# Patient Record
Sex: FEMALE | Race: WHITE | Employment: OTHER | URBAN - METROPOLITAN AREA
[De-identification: names, ages, dates, MRNs, and addresses within clinical notes are randomized per-mention and may not be internally consistent; named-entity substitution may affect disease eponyms.]

---

## 2019-10-29 ENCOUNTER — APPOINTMENT (OUTPATIENT)
Dept: GENERAL RADIOLOGY | Age: 74
End: 2019-10-29
Payer: MEDICARE

## 2019-10-29 ENCOUNTER — HOSPITAL ENCOUNTER (EMERGENCY)
Age: 74
Discharge: HOME | End: 2019-10-29
Attending: EMERGENCY MEDICINE
Payer: MEDICARE

## 2019-10-29 VITALS
DIASTOLIC BLOOD PRESSURE: 72 MMHG | HEART RATE: 76 BPM | SYSTOLIC BLOOD PRESSURE: 140 MMHG | RESPIRATION RATE: 16 BRPM | OXYGEN SATURATION: 99 % | TEMPERATURE: 98.1 F

## 2019-10-29 DIAGNOSIS — R00.2 PALPITATIONS: Primary | ICD-10-CM

## 2019-10-29 LAB
A/G RATIO: 1.8 (ref 1.1–2.2)
ALBUMIN SERPL-MCNC: 4.6 G/DL (ref 3.4–5)
ALP BLD-CCNC: 57 U/L (ref 40–129)
ALT SERPL-CCNC: 35 U/L (ref 10–40)
ANION GAP SERPL CALCULATED.3IONS-SCNC: 15 MMOL/L (ref 3–16)
AST SERPL-CCNC: 31 U/L (ref 15–37)
BASOPHILS ABSOLUTE: 0 K/UL (ref 0–0.2)
BASOPHILS RELATIVE PERCENT: 0.4 %
BILIRUB SERPL-MCNC: 0.5 MG/DL (ref 0–1)
BILIRUBIN URINE: NEGATIVE
BLOOD, URINE: NEGATIVE
BUN BLDV-MCNC: 11 MG/DL (ref 7–20)
CALCIUM SERPL-MCNC: 9.9 MG/DL (ref 8.3–10.6)
CHLORIDE BLD-SCNC: 94 MMOL/L (ref 99–110)
CLARITY: CLEAR
CO2: 23 MMOL/L (ref 21–32)
COLOR: YELLOW
CREAT SERPL-MCNC: 0.6 MG/DL (ref 0.6–1.2)
EOSINOPHILS ABSOLUTE: 0 K/UL (ref 0–0.6)
EOSINOPHILS RELATIVE PERCENT: 0.1 %
GFR AFRICAN AMERICAN: >60
GFR NON-AFRICAN AMERICAN: >60
GLOBULIN: 2.6 G/DL
GLUCOSE BLD-MCNC: 118 MG/DL (ref 70–99)
GLUCOSE URINE: NEGATIVE MG/DL
HCT VFR BLD CALC: 46.4 % (ref 36–48)
HEMOGLOBIN: 16 G/DL (ref 12–16)
KETONES, URINE: NEGATIVE MG/DL
LEUKOCYTE ESTERASE, URINE: ABNORMAL
LYMPHOCYTES ABSOLUTE: 0.7 K/UL (ref 1–5.1)
LYMPHOCYTES RELATIVE PERCENT: 9.5 %
MAGNESIUM: 2.2 MG/DL (ref 1.8–2.4)
MCH RBC QN AUTO: 33.7 PG (ref 26–34)
MCHC RBC AUTO-ENTMCNC: 34.6 G/DL (ref 31–36)
MCV RBC AUTO: 97.5 FL (ref 80–100)
MICROSCOPIC EXAMINATION: YES
MONOCYTES ABSOLUTE: 0.6 K/UL (ref 0–1.3)
MONOCYTES RELATIVE PERCENT: 7.8 %
NEUTROPHILS ABSOLUTE: 6.4 K/UL (ref 1.7–7.7)
NEUTROPHILS RELATIVE PERCENT: 82.2 %
NITRITE, URINE: NEGATIVE
PDW BLD-RTO: 12.4 % (ref 12.4–15.4)
PH UA: 6 (ref 5–8)
PHOSPHORUS: 3.6 MG/DL (ref 2.5–4.9)
PLATELET # BLD: 228 K/UL (ref 135–450)
PMV BLD AUTO: 6.9 FL (ref 5–10.5)
POTASSIUM SERPL-SCNC: 4.3 MMOL/L (ref 3.5–5.1)
PROTEIN UA: NEGATIVE MG/DL
RBC # BLD: 4.76 M/UL (ref 4–5.2)
RBC UA: NORMAL /HPF (ref 0–2)
SODIUM BLD-SCNC: 132 MMOL/L (ref 136–145)
SPECIFIC GRAVITY UA: <=1.005 (ref 1–1.03)
TOTAL PROTEIN: 7.2 G/DL (ref 6.4–8.2)
TROPONIN: <0.01 NG/ML
URINE TYPE: ABNORMAL
UROBILINOGEN, URINE: 0.2 E.U./DL
WBC # BLD: 7.8 K/UL (ref 4–11)
WBC UA: NORMAL /HPF (ref 0–5)

## 2019-10-29 PROCEDURE — 84100 ASSAY OF PHOSPHORUS: CPT

## 2019-10-29 PROCEDURE — 83735 ASSAY OF MAGNESIUM: CPT

## 2019-10-29 PROCEDURE — 93005 ELECTROCARDIOGRAM TRACING: CPT | Performed by: EMERGENCY MEDICINE

## 2019-10-29 PROCEDURE — 85025 COMPLETE CBC W/AUTO DIFF WBC: CPT

## 2019-10-29 PROCEDURE — 84484 ASSAY OF TROPONIN QUANT: CPT

## 2019-10-29 PROCEDURE — 80053 COMPREHEN METABOLIC PANEL: CPT

## 2019-10-29 PROCEDURE — 81001 URINALYSIS AUTO W/SCOPE: CPT

## 2019-10-29 PROCEDURE — 71046 X-RAY EXAM CHEST 2 VIEWS: CPT

## 2019-10-29 PROCEDURE — 99285 EMERGENCY DEPT VISIT HI MDM: CPT

## 2019-10-29 RX ORDER — PREDNISONE 1 MG/1
5 TABLET ORAL DAILY
Status: ON HOLD | COMMUNITY
End: 2021-05-12

## 2019-10-29 RX ORDER — AMLODIPINE BESYLATE 5 MG/1
5 TABLET ORAL DAILY
Status: ON HOLD | COMMUNITY
End: 2021-05-12

## 2019-10-29 RX ORDER — CHLORAL HYDRATE 500 MG
CAPSULE ORAL
COMMUNITY

## 2019-10-29 RX ORDER — ASCORBIC ACID 1000 MG
TABLET ORAL
COMMUNITY

## 2019-10-29 RX ORDER — MELOXICAM 15 MG/1
15 TABLET ORAL DAILY
Status: ON HOLD | COMMUNITY
End: 2021-05-12

## 2019-10-29 RX ORDER — AMPICILLIN TRIHYDRATE 250 MG
CAPSULE ORAL
Status: ON HOLD | COMMUNITY
End: 2021-05-13

## 2019-10-29 RX ORDER — METHOCARBAMOL 500 MG/1
500 TABLET, FILM COATED ORAL 4 TIMES DAILY
Status: ON HOLD | COMMUNITY
End: 2021-05-12

## 2019-10-29 RX ORDER — DONEPEZIL HYDROCHLORIDE 5 MG/1
10 TABLET, FILM COATED ORAL NIGHTLY
COMMUNITY

## 2019-10-29 RX ORDER — ASPIRIN 81 MG/1
81 TABLET, CHEWABLE ORAL DAILY
Status: ON HOLD | COMMUNITY
End: 2021-05-13

## 2019-10-29 RX ORDER — THIAMINE MONONITRATE (VIT B1) 100 MG
100 TABLET ORAL DAILY
COMMUNITY

## 2019-10-29 RX ORDER — CALCIUM CARBONATE 500(1250)
500 TABLET ORAL DAILY
COMMUNITY

## 2019-10-29 ASSESSMENT — ENCOUNTER SYMPTOMS
COLOR CHANGE: 0
ABDOMINAL PAIN: 0
BACK PAIN: 0
SHORTNESS OF BREATH: 0
EYES NEGATIVE: 1

## 2019-10-31 LAB
EKG ATRIAL RATE: 234 BPM
EKG ATRIAL RATE: 76 BPM
EKG DIAGNOSIS: NORMAL
EKG DIAGNOSIS: NORMAL
EKG P AXIS: 71 DEGREES
EKG P-R INTERVAL: 180 MS
EKG Q-T INTERVAL: 316 MS
EKG Q-T INTERVAL: 370 MS
EKG QRS DURATION: 68 MS
EKG QRS DURATION: 72 MS
EKG QTC CALCULATION (BAZETT): 384 MS
EKG QTC CALCULATION (BAZETT): 416 MS
EKG R AXIS: 65 DEGREES
EKG R AXIS: 99 DEGREES
EKG T AXIS: 56 DEGREES
EKG T AXIS: 60 DEGREES
EKG VENTRICULAR RATE: 76 BPM
EKG VENTRICULAR RATE: 89 BPM

## 2019-10-31 PROCEDURE — 93010 ELECTROCARDIOGRAM REPORT: CPT | Performed by: INTERNAL MEDICINE

## 2021-05-11 ENCOUNTER — HOSPITAL ENCOUNTER (INPATIENT)
Age: 76
LOS: 3 days | Discharge: NURSING, REHAB OR LONG-TERM CARE | DRG: 641 | End: 2021-05-17
Attending: EMERGENCY MEDICINE | Admitting: INTERNAL MEDICINE
Payer: MEDICARE

## 2021-05-11 ENCOUNTER — APPOINTMENT (OUTPATIENT)
Dept: CT IMAGING | Age: 76
DRG: 641 | End: 2021-05-11
Payer: MEDICARE

## 2021-05-11 ENCOUNTER — APPOINTMENT (OUTPATIENT)
Dept: GENERAL RADIOLOGY | Age: 76
DRG: 641 | End: 2021-05-11
Payer: MEDICARE

## 2021-05-11 DIAGNOSIS — E87.1 HYPONATREMIA: ICD-10-CM

## 2021-05-11 DIAGNOSIS — R29.6 FREQUENT FALLS: Primary | ICD-10-CM

## 2021-05-11 DIAGNOSIS — S22.080A COMPRESSION FRACTURE OF T12 VERTEBRA, INITIAL ENCOUNTER (HCC): ICD-10-CM

## 2021-05-11 PROBLEM — M50.30 DDD (DEGENERATIVE DISC DISEASE), CERVICAL: Status: ACTIVE | Noted: 2021-05-11

## 2021-05-11 PROBLEM — R53.1 GENERALIZED WEAKNESS: Status: ACTIVE | Noted: 2021-05-11

## 2021-05-11 PROBLEM — M51.36 DDD (DEGENERATIVE DISC DISEASE), LUMBAR: Status: ACTIVE | Noted: 2021-05-11

## 2021-05-11 PROBLEM — F03.90 DEMENTIA (HCC): Status: ACTIVE | Noted: 2021-05-11

## 2021-05-11 LAB
A/G RATIO: 1.7 (ref 1.1–2.2)
ALBUMIN SERPL-MCNC: 4 G/DL (ref 3.4–5)
ALP BLD-CCNC: 70 U/L (ref 40–129)
ALT SERPL-CCNC: 11 U/L (ref 10–40)
ANION GAP SERPL CALCULATED.3IONS-SCNC: 7 MMOL/L (ref 3–16)
AST SERPL-CCNC: 19 U/L (ref 15–37)
BASOPHILS ABSOLUTE: 0 K/UL (ref 0–0.2)
BASOPHILS RELATIVE PERCENT: 0.7 %
BILIRUB SERPL-MCNC: 0.3 MG/DL (ref 0–1)
BILIRUBIN URINE: NEGATIVE
BLOOD, URINE: NEGATIVE
BUN BLDV-MCNC: 4 MG/DL (ref 7–20)
CALCIUM SERPL-MCNC: 9.2 MG/DL (ref 8.3–10.6)
CHLORIDE BLD-SCNC: 90 MMOL/L (ref 99–110)
CLARITY: CLEAR
CO2: 26 MMOL/L (ref 21–32)
COLOR: YELLOW
CREAT SERPL-MCNC: <0.5 MG/DL (ref 0.6–1.2)
EKG ATRIAL RATE: 65 BPM
EKG DIAGNOSIS: NORMAL
EKG P AXIS: 78 DEGREES
EKG P-R INTERVAL: 230 MS
EKG Q-T INTERVAL: 388 MS
EKG QRS DURATION: 64 MS
EKG QTC CALCULATION (BAZETT): 403 MS
EKG R AXIS: 81 DEGREES
EKG T AXIS: 66 DEGREES
EKG VENTRICULAR RATE: 65 BPM
EOSINOPHILS ABSOLUTE: 0 K/UL (ref 0–0.6)
EOSINOPHILS RELATIVE PERCENT: 0.3 %
GFR AFRICAN AMERICAN: >60
GFR NON-AFRICAN AMERICAN: >60
GLOBULIN: 2.3 G/DL
GLUCOSE BLD-MCNC: 99 MG/DL (ref 70–99)
GLUCOSE URINE: NEGATIVE MG/DL
HCT VFR BLD CALC: 35.6 % (ref 36–48)
HEMOGLOBIN: 12.2 G/DL (ref 12–16)
KETONES, URINE: NEGATIVE MG/DL
LEUKOCYTE ESTERASE, URINE: NEGATIVE
LYMPHOCYTES ABSOLUTE: 1.2 K/UL (ref 1–5.1)
LYMPHOCYTES RELATIVE PERCENT: 16 %
MCH RBC QN AUTO: 30.7 PG (ref 26–34)
MCHC RBC AUTO-ENTMCNC: 34.3 G/DL (ref 31–36)
MCV RBC AUTO: 89.3 FL (ref 80–100)
MICROSCOPIC EXAMINATION: NORMAL
MONOCYTES ABSOLUTE: 0.5 K/UL (ref 0–1.3)
MONOCYTES RELATIVE PERCENT: 7.2 %
NEUTROPHILS ABSOLUTE: 5.5 K/UL (ref 1.7–7.7)
NEUTROPHILS RELATIVE PERCENT: 75.8 %
NITRITE, URINE: NEGATIVE
PDW BLD-RTO: 12.5 % (ref 12.4–15.4)
PH UA: 7.5 (ref 5–8)
PLATELET # BLD: 255 K/UL (ref 135–450)
PMV BLD AUTO: 7.2 FL (ref 5–10.5)
POTASSIUM SERPL-SCNC: 4.8 MMOL/L (ref 3.5–5.1)
PROTEIN UA: NEGATIVE MG/DL
RBC # BLD: 3.99 M/UL (ref 4–5.2)
SODIUM BLD-SCNC: 123 MMOL/L (ref 136–145)
SPECIFIC GRAVITY UA: 1.01 (ref 1–1.03)
TOTAL PROTEIN: 6.3 G/DL (ref 6.4–8.2)
TROPONIN: <0.01 NG/ML
URINE TYPE: NORMAL
UROBILINOGEN, URINE: 0.2 E.U./DL
WBC # BLD: 7.2 K/UL (ref 4–11)

## 2021-05-11 PROCEDURE — 72131 CT LUMBAR SPINE W/O DYE: CPT

## 2021-05-11 PROCEDURE — 99284 EMERGENCY DEPT VISIT MOD MDM: CPT

## 2021-05-11 PROCEDURE — 93005 ELECTROCARDIOGRAM TRACING: CPT | Performed by: PHYSICIAN ASSISTANT

## 2021-05-11 PROCEDURE — 6360000002 HC RX W HCPCS: Performed by: PHYSICIAN ASSISTANT

## 2021-05-11 PROCEDURE — 70450 CT HEAD/BRAIN W/O DYE: CPT

## 2021-05-11 PROCEDURE — U0003 INFECTIOUS AGENT DETECTION BY NUCLEIC ACID (DNA OR RNA); SEVERE ACUTE RESPIRATORY SYNDROME CORONAVIRUS 2 (SARS-COV-2) (CORONAVIRUS DISEASE [COVID-19]), AMPLIFIED PROBE TECHNIQUE, MAKING USE OF HIGH THROUGHPUT TECHNOLOGIES AS DESCRIBED BY CMS-2020-01-R: HCPCS

## 2021-05-11 PROCEDURE — 6370000000 HC RX 637 (ALT 250 FOR IP): Performed by: INTERNAL MEDICINE

## 2021-05-11 PROCEDURE — 72125 CT NECK SPINE W/O DYE: CPT

## 2021-05-11 PROCEDURE — 93010 ELECTROCARDIOGRAM REPORT: CPT | Performed by: INTERNAL MEDICINE

## 2021-05-11 PROCEDURE — 71045 X-RAY EXAM CHEST 1 VIEW: CPT

## 2021-05-11 PROCEDURE — 84484 ASSAY OF TROPONIN QUANT: CPT

## 2021-05-11 PROCEDURE — 96374 THER/PROPH/DIAG INJ IV PUSH: CPT

## 2021-05-11 PROCEDURE — 2580000003 HC RX 258: Performed by: PHYSICIAN ASSISTANT

## 2021-05-11 PROCEDURE — 80053 COMPREHEN METABOLIC PANEL: CPT

## 2021-05-11 PROCEDURE — U0005 INFEC AGEN DETEC AMPLI PROBE: HCPCS

## 2021-05-11 PROCEDURE — 81003 URINALYSIS AUTO W/O SCOPE: CPT

## 2021-05-11 PROCEDURE — 96361 HYDRATE IV INFUSION ADD-ON: CPT

## 2021-05-11 PROCEDURE — 85025 COMPLETE CBC W/AUTO DIFF WBC: CPT

## 2021-05-11 PROCEDURE — 6360000002 HC RX W HCPCS: Performed by: INTERNAL MEDICINE

## 2021-05-11 PROCEDURE — 96372 THER/PROPH/DIAG INJ SC/IM: CPT

## 2021-05-11 PROCEDURE — 73610 X-RAY EXAM OF ANKLE: CPT

## 2021-05-11 RX ORDER — ASPIRIN 81 MG/1
81 TABLET, CHEWABLE ORAL DAILY
Status: DISCONTINUED | OUTPATIENT
Start: 2021-05-12 | End: 2021-05-17 | Stop reason: HOSPADM

## 2021-05-11 RX ORDER — METHOCARBAMOL 500 MG/1
500 TABLET, FILM COATED ORAL 4 TIMES DAILY
Status: DISCONTINUED | OUTPATIENT
Start: 2021-05-11 | End: 2021-05-11

## 2021-05-11 RX ORDER — DONEPEZIL HYDROCHLORIDE 5 MG/1
5 TABLET, FILM COATED ORAL NIGHTLY
Status: DISCONTINUED | OUTPATIENT
Start: 2021-05-11 | End: 2021-05-11

## 2021-05-11 RX ORDER — AMLODIPINE BESYLATE 5 MG/1
5 TABLET ORAL DAILY
Status: DISCONTINUED | OUTPATIENT
Start: 2021-05-12 | End: 2021-05-17 | Stop reason: HOSPADM

## 2021-05-11 RX ORDER — IBUPROFEN 400 MG/1
400 TABLET ORAL EVERY 6 HOURS PRN
Status: DISCONTINUED | OUTPATIENT
Start: 2021-05-11 | End: 2021-05-14

## 2021-05-11 RX ORDER — LORAZEPAM 2 MG/ML
1 INJECTION INTRAMUSCULAR ONCE
Status: COMPLETED | OUTPATIENT
Start: 2021-05-11 | End: 2021-05-11

## 2021-05-11 RX ORDER — PROMETHAZINE HYDROCHLORIDE 25 MG/1
12.5 TABLET ORAL EVERY 6 HOURS PRN
Status: DISCONTINUED | OUTPATIENT
Start: 2021-05-11 | End: 2021-05-17 | Stop reason: HOSPADM

## 2021-05-11 RX ORDER — PREDNISONE 1 MG/1
5 TABLET ORAL DAILY
Status: DISCONTINUED | OUTPATIENT
Start: 2021-05-12 | End: 2021-05-11

## 2021-05-11 RX ORDER — ACETAMINOPHEN 650 MG/1
650 SUPPOSITORY RECTAL EVERY 6 HOURS PRN
Status: DISCONTINUED | OUTPATIENT
Start: 2021-05-11 | End: 2021-05-17 | Stop reason: HOSPADM

## 2021-05-11 RX ORDER — CALCIUM CARBONATE 500(1250)
500 TABLET ORAL DAILY
Status: DISCONTINUED | OUTPATIENT
Start: 2021-05-12 | End: 2021-05-17 | Stop reason: HOSPADM

## 2021-05-11 RX ORDER — SODIUM CHLORIDE 0.9 % (FLUSH) 0.9 %
10 SYRINGE (ML) INJECTION PRN
Status: DISCONTINUED | OUTPATIENT
Start: 2021-05-11 | End: 2021-05-17 | Stop reason: HOSPADM

## 2021-05-11 RX ORDER — ONDANSETRON 2 MG/ML
4 INJECTION INTRAMUSCULAR; INTRAVENOUS EVERY 6 HOURS PRN
Status: DISCONTINUED | OUTPATIENT
Start: 2021-05-11 | End: 2021-05-17 | Stop reason: HOSPADM

## 2021-05-11 RX ORDER — 0.9 % SODIUM CHLORIDE 0.9 %
1000 INTRAVENOUS SOLUTION INTRAVENOUS ONCE
Status: COMPLETED | OUTPATIENT
Start: 2021-05-11 | End: 2021-05-11

## 2021-05-11 RX ORDER — LANOLIN ALCOHOL/MO/W.PET/CERES
3 CREAM (GRAM) TOPICAL NIGHTLY PRN
Status: DISCONTINUED | OUTPATIENT
Start: 2021-05-11 | End: 2021-05-17 | Stop reason: HOSPADM

## 2021-05-11 RX ORDER — ACETAMINOPHEN 325 MG/1
650 TABLET ORAL EVERY 6 HOURS PRN
Status: DISCONTINUED | OUTPATIENT
Start: 2021-05-11 | End: 2021-05-17 | Stop reason: HOSPADM

## 2021-05-11 RX ORDER — DONEPEZIL HYDROCHLORIDE 5 MG/1
10 TABLET, FILM COATED ORAL NIGHTLY
Status: DISCONTINUED | OUTPATIENT
Start: 2021-05-11 | End: 2021-05-17 | Stop reason: HOSPADM

## 2021-05-11 RX ORDER — SODIUM CHLORIDE 9 MG/ML
25 INJECTION, SOLUTION INTRAVENOUS PRN
Status: DISCONTINUED | OUTPATIENT
Start: 2021-05-11 | End: 2021-05-17 | Stop reason: HOSPADM

## 2021-05-11 RX ORDER — MELOXICAM 7.5 MG/1
15 TABLET ORAL DAILY
Status: DISCONTINUED | OUTPATIENT
Start: 2021-05-12 | End: 2021-05-11

## 2021-05-11 RX ADMIN — SODIUM CHLORIDE 1000 ML: 9 INJECTION, SOLUTION INTRAVENOUS at 16:59

## 2021-05-11 RX ADMIN — Medication 3 MG: at 21:43

## 2021-05-11 RX ADMIN — ENOXAPARIN SODIUM 40 MG: 40 INJECTION SUBCUTANEOUS at 21:43

## 2021-05-11 RX ADMIN — LORAZEPAM 1 MG: 2 INJECTION, SOLUTION INTRAMUSCULAR; INTRAVENOUS at 15:13

## 2021-05-11 RX ADMIN — IBUPROFEN 400 MG: 400 TABLET, FILM COATED ORAL at 21:43

## 2021-05-11 RX ADMIN — DONEPEZIL HYDROCHLORIDE 10 MG: 5 TABLET, FILM COATED ORAL at 21:43

## 2021-05-11 ASSESSMENT — PAIN DESCRIPTION - LOCATION: LOCATION: BACK

## 2021-05-11 ASSESSMENT — PAIN DESCRIPTION - PAIN TYPE: TYPE: ACUTE PAIN

## 2021-05-11 NOTE — ED NOTES
Spoke to patient's daughter/POA, Milan Wilson. Daughter is currently out of town in [place]. She states patient has dementia and chronic back pain. Pt currently lives in a one floor house with a roommate. Pt had nerve damage which causes issues on the right side. Daughter states progressive weakness has rapidly progressed for the past week. Fell twice PTA in ED. Pt was taken to physical therapy appointment today and didn't do well. Pt sees Dr. Linsey Gottlieb at [facility]. Pt's daughter states she would like her daughter to be admitted to [facility] for rehab assistance if ED eval checks out okay. Pt's daughter does not have a specific facility in mind, but doesn't have one in particular in mind.       JULY Gunn  05/11/21 [id number]

## 2021-05-11 NOTE — CARE COORDINATION
Consult received: Assistance with discharge planning Daughter/POA would like rehab placement if possible    Writer placed call to Dtr whom is in agreement with SNF placement. Patient will need Pt/OT recs to support need. Writer sent list of SNF options to family to review. Case management to follow up on 1st and 2nd choice and make referrals as obtained. Writer also made referral to [facility] for LTC support option ie Assisted living placement. [place]

## 2021-05-11 NOTE — ED PROVIDER NOTES
[facility] Emergency Department    CHIEF COMPLAINT  Fall (patient reporting an increase in falls. states she fell yesterday getting out of the car, and again this morning going to the restroom. pt reporting she had to have assistance getting back to her chair. pt states she takes PT on Tuesdays and Thursdays but reports, \"My fucking legs don;'t work\" patient brought into the ED in a wheelchair and assisted to the stretcher from the wheelchair, pt gave very little assistance with transfer. pt c/o low back pain and left leg pain ), Gait Problem, Back Pain, and Leg Pain      SHARED SERVICE VISIT  I have seen and evaluated this patient with my supervising physician, Dr. Jacob Delgado. HISTORY OF PRESENT ILLNESS  Nancy Reyna is a 70 y.o. female who presents to the ED complaining of leg and back pain with frequent falls. Patient melo brittian. According to chart review has history of dementia. She lives at home with a friend. States that she typically ambulates using walker. Lost balance last week falling backwards striking head. Denies loss of consciousness. She has had no headaches dizziness. She denies neck pain. Does have increased low back pain. She denies any numbness, tingling, weakness of extremities. She has no complaints of chest pain or shortness of breath. Denies abdominal pain. No nausea, vomiting or diarrhea. No loss of bowel or bladder function. No saddle anesthesia. Is complaining of left ankle pain. States that when attempting to get up today she states that she slid out of her chair and required help up from the floor. Went to physical therapy today and reports that she worked on upper extremities. Patient melo historbraulio. No other complaints, modifying factors or associated symptoms. Nursing notes reviewed.    Past Medical History:   Diagnosis Date    Falls     Hypertension      Past Surgical History:   Procedure Laterality Date    HYSTERECTOMY      KNEE SURGERY      SHOULDER SURGERY       History reviewed. No pertinent family history. Social History     Socioeconomic History    Marital status: Single     Spouse name: Not on file    Number of children: Not on file    Years of education: Not on file    Highest education level: Not on file   Occupational History    Not on file   Social Needs    Financial resource strain: Not on file    Food insecurity     Worry: Not on file     Inability: Not on file    Transportation needs     Medical: Not on file     Non-medical: Not on file   Tobacco Use    Smoking status: Former Smoker    Smokeless tobacco: Never Used   Substance and Sexual Activity    Alcohol use: Yes    Drug use: Not on file    Sexual activity: Not Currently   Lifestyle    Physical activity     Days per week: Not on file     Minutes per session: Not on file    Stress: Not on file   Relationships    Social connections     Talks on phone: Not on file     Gets together: Not on file     Attends Yazidism service: Not on file     Active member of club or organization: Not on file     Attends meetings of clubs or organizations: Not on file     Relationship status: Not on file    Intimate partner violence     Fear of current or ex partner: Not on file     Emotionally abused: Not on file     Physically abused: Not on file     Forced sexual activity: Not on file   Other Topics Concern    Not on file   Social History Narrative    Not on file     No current facility-administered medications for this encounter.       Current Outpatient Medications   Medication Sig Dispense Refill    donepezil (ARICEPT) 5 MG tablet Take 5 mg by mouth nightly      amLODIPine (NORVASC) 5 MG tablet Take 5 mg by mouth daily      meloxicam (MOBIC) 15 MG tablet Take 15 mg by mouth daily      Cinnamon 500 MG CAPS Take by mouth      calcium carbonate (OSCAL) 500 MG TABS tablet Take 500 mg by mouth daily      Omega-3 1000 MG CAPS Take by mouth      methocarbamol (ROBAXIN) 500 MG tablet Take 500 mg by mouth 4 times daily      vitamin B-1 (THIAMINE) 100 MG tablet Take 100 mg by mouth daily      aspirin 81 MG chewable tablet Take 81 mg by mouth daily      RESVERATROL PO Take by mouth 2 times daily      Garlic 10 MG CAPS Take by mouth      Coenzyme Q10 (CO Q 10) 10 MG CAPS Take by mouth      predniSONE (DELTASONE) 5 MG tablet Take 5 mg by mouth daily       No Known Allergies    REVIEW OF SYSTEMS  10 systems reviewed, pertinent positives per HPI otherwise noted to be negative    PHYSICAL EXAM  BP (!) 113/50   Pulse 73   Temp 97.8 °F (36.6 °C) (Oral)   Resp 19   Ht 4' 6\" (1.372 m)   Wt 117 lb (53.1 kg)   SpO2 99%   BMI 28.21 kg/m²   GENERAL APPEARANCE: Awake and alert. Cooperative. No acute distress. HEAD: Normocephalic. Atraumatic. Posterior scalp hematoma. No additional hematomas, lesions, lacerations or abrasions. Negative for singh signs or raccoon's eyes. EYES: PERRL. EOM's grossly intact. No periorbital edema or erythema. No proptosis. No blood noted anterior chambers. ENT: Mucous membranes are moist.   NECK: Supple. No midline bony tenderness. No crepitus, deformity, or step-off noted. No swelling, bruising, color changes. HEART: RRR. No murmurs. No chest wall tenderness. LUNGS: Respirations unlabored. CTAB. Good air exchange. Speaking comfortably in full sentences. ABDOMEN: Soft. Non-distended. Non-tender. No guarding or rebound. No midline pulsatile mass. BACK: On exam of thoracic and lumbar spine, there is no swelling, bruising, or color change noted. There is mild to moderate lumbar midline bony tenderness, without crepitus, deformity, or step off. Patient exhibits tenderness of lumbar paraspinal musculature to right and left of midline. There is no point tenderness over the SI Joint. EXTREMITIES: No peripheral edema. No pain in upper extremities. No pelvic instability. No reproducible tenderness to knees.   Left ankle tender upon palpation without redness warmth or swelling. No evidence of dislocation subluxations. Normal range of motion and strength testing. Pedal pulses palpable. Moves all extremities equally. All extremities neurovascularly intact. SKIN: Warm and dry. No acute rashes. NEUROLOGICAL: Alert and oriented. CN's 2-12 intact. No gross facial drooping. Strength 5/5, sensation intact. HSNE spine intact. PSYCHIATRIC: Normal mood and affect. RADIOLOGY  Xr Ankle Left (min 3 Views)    Result Date: 5/11/2021  EXAMINATION: THREE XRAY VIEWS OF THE LEFT ANKLE 5/11/2021 1:49 pm COMPARISON: None. HISTORY: ORDERING SYSTEM PROVIDED HISTORY: fall TECHNOLOGIST PROVIDED HISTORY: Reason for exam:->fall FINDINGS: Visualized portions of the tibia and fibula appear intact, articulating normally at the ankle joint. Dome of the talus and tibial plafond appear unremarkable. Ankle mortise appears normally aligned. No retained radiopaque foreign body. Surrounding soft tissues appear mildly edematous anterior. No acute osseous abnormality evident. Soft tissue edema, presumably reactive edema, contusion and/or sprain. Follow up imaging is recommended if pain persists or worsens. Ct Head Wo Contrast    Result Date: 5/11/2021  EXAMINATION: CT OF THE HEAD WITHOUT CONTRAST  5/11/2021 2:09 pm TECHNIQUE: CT of the head was performed without the administration of intravenous contrast. Dose modulation, iterative reconstruction, and/or weight based adjustment of the mA/kV was utilized to reduce the radiation dose to as low as reasonably achievable. COMPARISON: None. HISTORY: ORDERING SYSTEM PROVIDED HISTORY: fall TECHNOLOGIST PROVIDED HISTORY: Reason for exam:->fall Has a \"code stroke\" or \"stroke alert\" been called? ->No Decision Support Exception - unselect if not a suspected or confirmed emergency medical condition->Emergency Medical Condition (MA) Reason for Exam: fall this AM, hit head, bump on back of head, no LOC Acuity: Acute Type of Exam: Initial FINDINGS: Motion artifact degrades the study BRAIN/VENTRICLES: Ventricles are midline in position. There is prominence of the sulci, compatible with atrophy. There is mild-to-moderate periventricular hypodensity seen. Scattered additional areas of hypodensity are seen throughout the frontal and parietal white matter. There is intracranial atherosclerosis. ORBITS: The visualized portion of the orbits demonstrate no acute abnormality. SINUSES: No significant mastoid opacification noted. There is near complete opacification of the left sphenoid cellule. Increased density seen in left sphenoid cellule. SOFT TISSUES/SKULL:  No acute abnormality of the visualized skull or soft tissues. No acute traumatic intracranial abnormality Atrophy and small-vessel ischemic change Chronic appearing left sphenoid sinus disease     Ct Cervical Spine Wo Contrast    Result Date: 5/11/2021  EXAMINATION: CT OF THE CERVICAL SPINE WITHOUT CONTRAST 5/11/2021 2:09 pm TECHNIQUE: CT of the cervical spine was performed without the administration of intravenous contrast. Multiplanar reformatted images are provided for review. Dose modulation, iterative reconstruction, and/or weight based adjustment of the mA/kV was utilized to reduce the radiation dose to as low as reasonably achievable. COMPARISON: None. HISTORY: ORDERING SYSTEM PROVIDED HISTORY: fall TECHNOLOGIST PROVIDED HISTORY: Reason for exam:->fall Decision Support Exception - unselect if not a suspected or confirmed emergency medical condition->Emergency Medical Condition (MA) Reason for Exam: fall this AM, hit head, denies neck pain Acuity: Acute Type of Exam: Initial FINDINGS: No significant mastoid opacification noted. Bony ring of C1 appears intact. Spurring is seen at the tip of the dens. Vertebral bodies appear intact.   Scattered levels of facet arthropathy are seen throughout There is 2 mm anterolisthesis of C3 on C4 There is 4 mm anterolisthesis of C4 on C5 Disc space narrowing and disc space spur formation is seen, most pronounced at C5-C6 No focal consolidation is seen in the apices. No acute osseous abnormality Multilevel degenerative disc disease and facet arthropathy throughout. Ct Lumbar Spine Wo Contrast    Result Date: 5/11/2021  EXAMINATION: CT OF THE LUMBAR SPINE WITHOUT CONTRAST  5/11/2021 TECHNIQUE: CT of the lumbar spine was performed without the administration of intravenous contrast. Multiplanar reformatted images are provided for review. Dose modulation, iterative reconstruction, and/or weight based adjustment of the mA/kV was utilized to reduce the radiation dose to as low as reasonably achievable. COMPARISON: None HISTORY: ORDERING SYSTEM PROVIDED HISTORY: fall TECHNOLOGIST PROVIDED HISTORY: Reason for exam:->fall Decision Support Exception - unselect if not a suspected or confirmed emergency medical condition->Emergency Medical Condition (MA) Reason for Exam: fall this AM, some lower back pain, c/o left foot pain Acuity: Acute Type of Exam: Initial FINDINGS: There is mild scoliotic curvature of the lumbar spine, with multiple levels of disc space narrowing and disc space spur formation seen. Subtle wedging is seen in the superior endplate of M53, with a questionable fracture line on axial images. Disc space narrowing and disc space spur formation is seen L1-L2-L2-L3, and L4-L5. Scattered levels of facet arthropathy are seen. No definite vertebral body fracture noted Small splenic artery aneurysm is seen, incidentally noted, measuring 11 mm in size. Atherosclerotic change is seen in the aorta. No retroperitoneal hematoma. Age indeterminate wedging superior endplate G68 left. Correlate with point tenderness. Scattered levels of degenerative disc disease. Small splenic artery aneurysm is seen, incidentally noted, measuring 11 mm in size.      Xr Chest Portable    Result Date: 5/11/2021  EXAMINATION: ONE XRAY VIEW OF THE CHEST 5/11/2021 2:38 pm COMPARISON: 10/29/2019 HISTORY: ORDERING SYSTEM PROVIDED HISTORY: fall TECHNOLOGIST PROVIDED HISTORY: Reason for exam:->fall Reason for Exam: PAIN S/P FALL Acuity: Acute Type of Exam: Initial FINDINGS: The cardiomediastinal silhouette is normal in size and contour. The lungs are clear. No pleural effusion or pneumothorax is present. Bilateral breast implants. No acute cardiopulmonary process     ED COURSE  Patient received Ativan for agitation, with good relief. Triage vitals stable. CBC without leukocytosis or anemia. CMP suggesting some dehydration with decreased sodium and chloride levels. Troponin less than 0.01. Urinalysis without evidence for infectious process. X-ray imaging of left ankle without obvious fracture dislocation. Stable portable chest x-ray. Head CT without evidence for acute intercranial normality. CT cervical spine showing degenerative changes without evidence for fractures dislocations. CT lumbar spine with age-indeterminate T12 wedging. Patient does have point tenderness and suspecting acute compression fracture. Nursing staff had discussion with patient's daughter and POA who reports that with her history of dementia and falls more frequently they would like her to be admitted for potential placement. I have discussed care with hospital medicine regarding admission and orders placed. A discussion was had with Ms. Yana William regarding falls, back pain, ED findings and recommendations for admission. Risk management discussed and shared decision making had with patient and/or surrogate. All questions were answered. Patient in agreement.     MDM  Results for orders placed or performed during the hospital encounter of 05/11/21   CBC auto differential   Result Value Ref Range    WBC 7.2 4.0 - 11.0 K/uL    RBC 3.99 (L) 4.00 - 5.20 M/uL    Hemoglobin 12.2 12.0 - 16.0 g/dL    Hematocrit 35.6 (L) 36.0 - 48.0 %    MCV 89.3 80.0 - 100.0 fL    MCH 30.7 26.0 - 34.0 pg    MCHC 34.3 31.0 - 36.0 g/dL    RDW 12.5 12.4 - 15.4 %    Platelets 231 052 - 742 K/uL    MPV 7.2 5.0 - 10.5 fL    Neutrophils % 75.8 %    Lymphocytes % 16.0 %    Monocytes % 7.2 %    Eosinophils % 0.3 %    Basophils % 0.7 %    Neutrophils Absolute 5.5 1.7 - 7.7 K/uL    Lymphocytes Absolute 1.2 1.0 - 5.1 K/uL    Monocytes Absolute 0.5 0.0 - 1.3 K/uL    Eosinophils Absolute 0.0 0.0 - 0.6 K/uL    Basophils Absolute 0.0 0.0 - 0.2 K/uL   Comprehensive metabolic panel   Result Value Ref Range    Sodium 123 (L) 136 - 145 mmol/L    Potassium 4.8 3.5 - 5.1 mmol/L    Chloride 90 (L) 99 - 110 mmol/L    CO2 26 21 - 32 mmol/L    Anion Gap 7 3 - 16    Glucose 99 70 - 99 mg/dL    BUN 4 (L) 7 - 20 mg/dL    CREATININE <0.5 (L) 0.6 - 1.2 mg/dL    GFR Non-African American >60 >60    GFR African American >60 >60    Calcium 9.2 8.3 - 10.6 mg/dL    Total Protein 6.3 (L) 6.4 - 8.2 g/dL    Albumin 4.0 3.4 - 5.0 g/dL    Albumin/Globulin Ratio 1.7 1.1 - 2.2    Total Bilirubin 0.3 0.0 - 1.0 mg/dL    Alkaline Phosphatase 70 40 - 129 U/L    ALT 11 10 - 40 U/L    AST 19 15 - 37 U/L    Globulin 2.3 g/dL   Troponin   Result Value Ref Range    Troponin <0.01 <0.01 ng/mL   Urinalysis   Result Value Ref Range    Color, UA Yellow Straw/Yellow    Clarity, UA Clear Clear    Glucose, Ur Negative Negative mg/dL    Bilirubin Urine Negative Negative    Ketones, Urine Negative Negative mg/dL    Specific Gravity, UA 1.015 1.005 - 1.030    Blood, Urine Negative Negative    pH, UA 7.5 5.0 - 8.0    Protein, UA Negative Negative mg/dL    Urobilinogen, Urine 0.2 <2.0 E.U./dL    Nitrite, Urine Negative Negative    Leukocyte Esterase, Urine Negative Negative    Microscopic Examination Not Indicated     Urine Type NotGiven    EKG 12 Lead   Result Value Ref Range    Ventricular Rate 65 BPM    Atrial Rate 65 BPM    P-R Interval 230 ms    QRS Duration 64 ms    Q-T Interval 388 ms    QTc Calculation (Bazett) 403 ms    P Axis 78 degrees    R Axis 81 degrees    T Axis 66 degrees    Diagnosis       Baseline artifactSinus rhythm with 1st degree A-V blockOtherwise normal ECGConfirmed by Kedar oCker MD, An Mccabe ([id number]) on 5/11/2021 5:06:09 PM     I spoke with Gurdeep Davis. We thoroughly discussed the history, physical exam, laboratory and imaging studies, as well as, emergency department course. Based upon that discussion, we've decided to admit Patricia Coronado to the hospital for further observation, evaluation, and treatment. Final Impression  1. Frequent falls    2. Compression fracture of T12 vertebra, initial encounter ([facility] [address]    3. Hyponatremia      Blood pressure (!) 146/78, pulse 70, temperature 98.1 °F (36.7 °C), temperature source Oral, resp. rate 18, height 4' 6\" (1.372 m), weight 117 lb (53.1 kg), SpO2 97 %. DISPOSITION  Patient was discharged to home in good condition.           Broderick [place]  05/11/21 [id number]

## 2021-05-11 NOTE — ED NOTES
Pt constantly asking to put her clothes back on. Informed patient needs to stay in hospital gown for the time being. Placed all jewelry back on patient by request:  2 necklaces, 4 earrings.       Pineda Maxwell RN  05/11/21 [id number]

## 2021-05-11 NOTE — ED NOTES
[id number] - called RN Lucie Ortiz at case management. Per Lucie Ortiz, the daughter of the pt was given nursing home list for possible options of placement. Lucie Ortiz stated that pt will not get placed today.       Vijaya Alfonso  05/11/21 [id number]

## 2021-05-11 NOTE — H&P
Hospital Medicine History & Physical      Patient:  Katelin Drew  :     MRN:   [id number]  Date of Service: 21    CHIEF COMPLAINT:  falls    HISTORY OF PRESENT ILLNESS:    Katelin Drew is a 70 y.o. female. She presents for increasingly frequent falls. She has chronic back pain, chronic ambulatory dysfunction requiring a walker, and dementia. She lives in a single story home with a friend. She typically ambulates using walker. She fell yesterday getting out of a car and again this morning going to the restroom. Today she slid out of her chair. She could not get up on her own and needed assistance getting back to her chair. She already does PT twice a week. Patient denies any specific injuries with these falls. She denies LOC. She denies any current numbness, tingling, or focal weakness. Patient's daughter, Treva, is her HCPOA. She is currently out of town in [place]. She requested that patient be placed in an ECF for rehabilitation. Review of Systems:  All pertinent positives and negatives are as noted in the HPI section. All other systems were reviewed and are negative. Past Medical History:   Diagnosis Date    Falls     Hypertension        Past Surgical History:   Procedure Laterality Date    HYSTERECTOMY      KNEE SURGERY      SHOULDER SURGERY           Prior to Admission medications    Medication Sig Start Date End Date Taking?  Authorizing Provider   donepezil (ARICEPT) 5 MG tablet Take 5 mg by mouth nightly    Historical Provider, MD   amLODIPine (NORVASC) 5 MG tablet Take 5 mg by mouth daily    Historical Provider, MD   meloxicam (MOBIC) 15 MG tablet Take 15 mg by mouth daily    Historical Provider, MD   Cinnamon 500 MG CAPS Take by mouth    Historical Provider, MD   calcium carbonate (OSCAL) 500 MG TABS tablet Take 500 mg by mouth daily    Historical Provider, MD   [place]-3 1000 MG CAPS Take by mouth    Historical Provider, MD   methocarbamol [facility] Discharge Summary    Tonio Alarcon MRN: [id number]   YOB: 1977 Age: 39 year old     Date of Admission:  10/11/2017  Date of Discharge::  10/15/2017  Admitting Physician:  Celio Keyes MD  Discharge Physician:  Cayetano Vargas MD  Primary Care Physician:         Christiano Palacios          Admission Diagnoses:   Perforated peptic ulcer (H) [K27.5]          Discharge Diagnosis:   Same          Procedures:   Laparoscopic exploration abdominal cavity, conversion to open surgery and Garret patch repair of perforated peptic ulcer.         Non-operative procedures:   None performed          Consultations:   MEDICATION HISTORY IP PHARMACY CONSULT  VASCULAR ACCESS CARE ADULT IP CONSULT          Brief History of Illness:   Tonio Alarcon is a 39 year old male with no PMH or PSH who presents with multiple days of epigastric pain. He was seen in the ED 1 week ago for similar complaints and work up did not reveal an etiology. Pain seemed to get a little better but then worsened again. It wakes him up at night and is shooting/sharp. He doesn't have a history of similar pain and does not take NSAIDS or PPIs. He does smoke and occasionally drink. His father  from stomach cancer at age 57. CT at [facility] ED demonstrated free air and fluid in the upper abdomen and he was transferred to [facility] for further work up and management         Hospital Course:   The patient underwent the above operation on 10/11/2017, which he tolerated well without complications. He was transferred to the floor for routine postoperative care and the remainder of his hospitalization was unremarkable.    At the time of discharge, he was tolerating a regular diet, ambulating, voiding spontaneously without difficulty, and pain was controlled with oral pain medications. The patient was discharged home in stable and improved condition. He will follow up in clinic in 4-6 weeks.         Final  (ROBAXIN) 500 MG tablet Take 500 mg by mouth 4 times daily    Historical Provider, MD   vitamin B-1 (THIAMINE) 100 MG tablet Take 100 mg by mouth daily    Historical Provider, MD   aspirin 81 MG chewable tablet Take 81 mg by mouth daily    Historical Provider, MD   RESVERATROL PO Take by mouth 2 times daily    Historical Provider, MD   Garlic 10 MG CAPS Take by mouth    Historical Provider, MD   Coenzyme Q10 (CO Q 10) 10 MG CAPS Take by mouth    Historical Provider, MD   predniSONE (DELTASONE) 5 MG tablet Take 5 mg by mouth daily    Historical Provider, MD       Allergies:   Patient has no known allergies. Social:   reports that she has quit smoking. She has never used smokeless tobacco.   reports current alcohol use. Social History     Substance and Sexual Activity   Drug Use Not on file       History reviewed. No pertinent family history. PHYSICAL EXAM:  I performed this physical examination. Vitals:  Patient Vitals for the past 24 hrs:   BP Temp Temp src Pulse Resp SpO2 Height Weight   05/11/21 1642 139/68 -- -- 70 16 98 % -- --   05/11/21 1325 (!) 113/50 97.8 °F (36.6 °C) Oral 73 19 99 % 4' 6\" (1.372 m) 117 lb (53.1 kg)     GEN:  Appearance:  Age appropriate female in NAD . Level of Consciousness:  Alert . Orientation:  Self and \"the hospital\"    HEENT: Sclera anicteric.  no conjunctival chemosis. moist mucus membranes. no specific or diagnostic oral lesions. NECK:  no signs of meningismus. Jugular veins not distended. Carotid pulses  2+.  no cervical lymphadenopathy. no thyromegaly. CV:  regular rhythm. normal S1 & S2.    no murmur. no rub.  no gallop. PULM:  Chest excursion is symmetric. Breath sounds are vesicular. Adventitious sounds:  none    AB:  Abdominal shape is normal.  Bowel sounds are active. Generally soft to palpation. no tenderness is present. no involuntary guarding. no rebound guarding. EXTR:  Skin is warm. Capillary refill brisk.     No Pathology Result:   No pathology submitted         Medications Prior to Admission:     Prescriptions Prior to Admission   Medication Sig Dispense Refill Last Dose     vitamin D3 (CHOLECALCIFEROL) 14636 UNITS capsule Take 50,000 Units by mouth every 7 days   Past Month at Unknown time            Discharge Medications:     Current Discharge Medication List      START taking these medications    Details   ondansetron (ZOFRAN-ODT) 4 MG ODT tab Take 1 tablet (4 mg) by mouth every 6 hours as needed for nausea or vomiting  Qty: 120 tablet, Refills: 1    Associated Diagnoses: Perforated peptic ulcer (H)      amoxicillin-clavulanate (AUGMENTIN) 500-125 MG per tablet Take 1 tablet by mouth 2 times daily for 7 days  Qty: 14 tablet, Refills: 0    Associated Diagnoses: Perforated peptic ulcer (H)      pantoprazole (PROTONIX) 40 MG EC tablet Take 1 tablet (40 mg) by mouth daily Take 30-60 minutes before a meal.  Qty: 90 tablet, Refills: 3    Associated Diagnoses: Perforated peptic ulcer (H)         CONTINUE these medications which have NOT CHANGED    Details   vitamin D3 (CHOLECALCIFEROL) 70964 UNITS capsule Take 50,000 Units by mouth every 7 days                  Day of Discharge Physical Exam:   Temp:  [97.8  F (36.6  C)-99.3  F (37.4  C)] 98  F (36.7  C)  Pulse:  [79-89] 80  Resp:  [16-18] 16  BP: (140-153)/() 147/100  SpO2:  [100 %] 100 %  General: AAOx4, NAD, lying comfortably in bed  CV/Pulm: RRR, no dyspnea, NLB on RA  Abd: soft, non-distended, appropriately tender, midline incision clean dry and intact with staples  Extremities: WWP, peripheral pulses intact  Neuro: moving all extremities spontaneously          Discharge Instructions and Follow-Up:     Reason for your hospital stay   Perforated peptic ulcer     [facility] Follow-up and recommended labs and tests   Follow up with primary care provider, Christiano Palacios, within 7 days to evaluate after surgery.  No follow up labs or test are needed, but you  specific or pathognomic rash. No clubbing. No pitting edema. Bilateral DP pulses 2+. R PT pulse 2+. Left PT pulse not palpable. NEURO: Lower extremity sensation ,motor function, and DTR's intact       LABS:  Lab Results   Component Value Date    WBC 7.2 05/11/2021    HGB 12.2 05/11/2021    HCT 35.6 (L) 05/11/2021    MCV 89.3 05/11/2021     05/11/2021     Lab Results   Component Value Date    CREATININE <0.5 (L) 05/11/2021    BUN 4 (L) 05/11/2021     (L) 05/11/2021    K 4.8 05/11/2021    CL 90 (L) 05/11/2021    CO2 26 05/11/2021     Lab Results   Component Value Date    ALT 11 05/11/2021    AST 19 05/11/2021    ALKPHOS 70 05/11/2021    BILITOT 0.3 05/11/2021     Lab Results   Component Value Date    TROPONINI <0.01 05/11/2021     No results for input(s): PHART, VRX6XFX, PO2ART in the last 72 hours. IMAGING:  Xr Ankle Left (min 3 Views)    Result Date: 5/11/2021  EXAMINATION: THREE XRAY VIEWS OF THE LEFT ANKLE 5/11/2021 1:49 pm COMPARISON: None. HISTORY: ORDERING SYSTEM PROVIDED HISTORY: fall TECHNOLOGIST PROVIDED HISTORY: Reason for exam:->fall FINDINGS: Visualized portions of the tibia and fibula appear intact, articulating normally at the ankle joint. Dome of the talus and tibial plafond appear unremarkable. Ankle mortise appears normally aligned. No retained radiopaque foreign body. Surrounding soft tissues appear mildly edematous anterior. No acute osseous abnormality evident. Soft tissue edema, presumably reactive edema, contusion and/or sprain. Follow up imaging is recommended if pain persists or worsens. Ct Head Wo Contrast    Result Date: 5/11/2021  EXAMINATION: CT OF THE HEAD WITHOUT CONTRAST  5/11/2021 2:09 pm TECHNIQUE: CT of the head was performed without the administration of intravenous contrast. Dose modulation, iterative reconstruction, and/or weight based adjustment of the mA/kV was utilized to reduce the radiation dose to as low as reasonably achievable. will need to continue taking acid-reducing medication for the long-term, and you will need to get your H.pylori testing results (either at Surgery Clinic follow up or at Primary Care follow up).       Appointments on [place] and/or [facility] (with [facility] or [facility] provider or service). Call [phone] if you haven't heard regarding these appointments within 7 days of discharge.     Activity   Your activity upon discharge: no driving while on analgesics.     Discharge Instructions   Discharge Instructions  Activity  - No strenuous exercise for 3 weeks.  - No lifting, pushing, pulling more than 15-20 pounds for 3 weeks.   - Do not strain with bowel movements.  - Do not drive until you can press the brake pedal quickly and fully without pain.   - Do not operate a motor vehicle while taking narcotic pain medications.     Incisions  - You may shower and get incisions wet starting 24 hrs after surgery.  - Do not scrub incisions or submerge wounds (e.g. bath, pool, hot tub, etc.) for 2 weeks or until the glue or steri-strips have come off.  - You may remove wound dressing 24 hours after surgery.   - If purple Dermabond glue was used, avoid applying any lotions or ointments.   - If steri-strips were used, they will fall off on their own.   - The type of wound closure used for your incision:  Staples  - Leave incision open to air.  Cover with gauze only if needed for comfort or to protect clothing from drainage.        Drain Care  - You do not have a drain.  Specific care/instructions:  Not Applicable    Medications  - Do not take any additional Tylenol (acetaminophen) while using a narcotic pain medication which includes acetaminophen.  - Do not take more than 4,000mg of acetaminophen in any 24 hour period, as this can cause liver damage.  - Take stool softeners such as Senna or Miralax while you are using narcotics, but stop if you develop diarrhea.   - Wean yourself off of narcotic pain medications.      Follow-Up:  - Call your primary care provider to touch base regarding your recent admission.     - Call or return sooner than your regularly scheduled visit if you develop any of the following: fever >101.5, uncontrolled pain, uncontrolled nausea or vomiting, as well as increased redness, swelling, or drainage from your wound.   -  A nurse from the General Surgery Clinic will contact you 24 hours, or next business day, after your discharge from the hospital.  If you have questions or to schedule a follow up appointment please call the General Surgery Clinic at [phone].  Call [phone] and ask to speak with the Surgery resident on call if you are having troubles in the evenings, at night, or on the weekend.  -  If you are receiving home care please inform your home care nurse of our contact number.     Full Code     Diet   Follow this diet upon discharge: Regular           Home Health Care:   Not needed          Discharge Disposition:   Discharged to home   Condition at discharge: Stable    Patient discussed with staff on day of discharge.      Monisha Cho DO   PGY-1  [facility] with General Surgery  [facility]       COMPARISON: None. HISTORY: ORDERING SYSTEM PROVIDED HISTORY: fall TECHNOLOGIST PROVIDED HISTORY: Reason for exam:->fall Has a \"code stroke\" or \"stroke alert\" been called? ->No Decision Support Exception - unselect if not a suspected or confirmed emergency medical condition->Emergency Medical Condition (MA) Reason for Exam: fall this AM, hit head, bump on back of head, no LOC Acuity: Acute Type of Exam: Initial FINDINGS: Motion artifact degrades the study BRAIN/VENTRICLES: Ventricles are midline in position. There is prominence of the sulci, compatible with atrophy. There is mild-to-moderate periventricular hypodensity seen. Scattered additional areas of hypodensity are seen throughout the frontal and parietal white matter. There is intracranial atherosclerosis. ORBITS: The visualized portion of the orbits demonstrate no acute abnormality. SINUSES: No significant mastoid opacification noted. There is near complete opacification of the left sphenoid cellule. Increased density seen in left sphenoid cellule. SOFT TISSUES/SKULL:  No acute abnormality of the visualized skull or soft tissues. No acute traumatic intracranial abnormality Atrophy and small-vessel ischemic change Chronic appearing left sphenoid sinus disease     Ct Cervical Spine Wo Contrast    Result Date: 5/11/2021  EXAMINATION: CT OF THE CERVICAL SPINE WITHOUT CONTRAST 5/11/2021 2:09 pm TECHNIQUE: CT of the cervical spine was performed without the administration of intravenous contrast. Multiplanar reformatted images are provided for review. Dose modulation, iterative reconstruction, and/or weight based adjustment of the mA/kV was utilized to reduce the radiation dose to as low as reasonably achievable. COMPARISON: None.  HISTORY: ORDERING SYSTEM PROVIDED HISTORY: fall TECHNOLOGIST PROVIDED HISTORY: Reason for exam:->fall Decision Support Exception - unselect if not a suspected or confirmed emergency medical condition->Emergency Medical Condition (MA) Reason for Exam: fall this AM, hit head, denies neck pain Acuity: Acute Type of Exam: Initial FINDINGS: No significant mastoid opacification noted. Bony ring of C1 appears intact. Spurring is seen at the tip of the dens. Vertebral bodies appear intact. Scattered levels of facet arthropathy are seen throughout There is 2 mm anterolisthesis of C3 on C4 There is 4 mm anterolisthesis of C4 on C5 Disc space narrowing and disc space spur formation is seen, most pronounced at C5-C6 No focal consolidation is seen in the apices. No acute osseous abnormality Multilevel degenerative disc disease and facet arthropathy throughout. Ct Lumbar Spine Wo Contrast    Result Date: 5/11/2021  EXAMINATION: CT OF THE LUMBAR SPINE WITHOUT CONTRAST  5/11/2021 TECHNIQUE: CT of the lumbar spine was performed without the administration of intravenous contrast. Multiplanar reformatted images are provided for review. Dose modulation, iterative reconstruction, and/or weight based adjustment of the mA/kV was utilized to reduce the radiation dose to as low as reasonably achievable. COMPARISON: None HISTORY: ORDERING SYSTEM PROVIDED HISTORY: fall TECHNOLOGIST PROVIDED HISTORY: Reason for exam:->fall Decision Support Exception - unselect if not a suspected or confirmed emergency medical condition->Emergency Medical Condition (MA) Reason for Exam: fall this AM, some lower back pain, c/o left foot pain Acuity: Acute Type of Exam: Initial FINDINGS: There is mild scoliotic curvature of the lumbar spine, with multiple levels of disc space narrowing and disc space spur formation seen. Subtle wedging is seen in the superior endplate of G18, with a questionable fracture line on axial images. Disc space narrowing and disc space spur formation is seen L1-L2-L2-L3, and L4-L5. Scattered levels of facet arthropathy are seen.   No definite vertebral body fracture noted Small splenic artery aneurysm is seen, incidentally noted, measuring 11 mm in size.  Atherosclerotic change is seen in the aorta. No retroperitoneal hematoma. Age indeterminate wedging superior endplate E79 left. Correlate with point tenderness. Scattered levels of degenerative disc disease. Small splenic artery aneurysm is seen, incidentally noted, measuring 11 mm in size. Xr Chest Portable    Result Date: 5/11/2021  EXAMINATION: ONE XRAY VIEW OF THE CHEST 5/11/2021 2:38 pm COMPARISON: 10/29/2019 HISTORY: ORDERING SYSTEM PROVIDED HISTORY: fall TECHNOLOGIST PROVIDED HISTORY: Reason for exam:->fall Reason for Exam: PAIN S/P FALL Acuity: Acute Type of Exam: Initial FINDINGS: The cardiomediastinal silhouette is normal in size and contour. The lungs are clear. No pleural effusion or pneumothorax is present. Bilateral breast implants. No acute cardiopulmonary process     I directly reviewed all recent imaging studies as well as pertinent prior studies. Radiology reports may or may not be available at the time of my review. EKG:  New and pertinent prior tracings were directly reviewed. My interpretation is as follows:  NSR w/ 1st degree AV block. Active Hospital Problems    Diagnosis Date Noted    Generalized weakness [R53.1] 05/11/2021    Dementia ([address] [F03.90] 05/11/2021    Frequent falls [R29.6] 05/11/2021    DDD (degenerative disc disease), lumbar [M51.36] 05/11/2021    DDD (degenerative disc disease), cervical [M50.30] 05/11/2021    Hyponatremia [E87.1] 05/11/2021       ASSESSMENT & PLAN  Frequent falls, Generalized weakness  -  PT/OT assessments requested to guide placement needs.  -  T12 compression fracture is minimal and patient is not having more than her usual pain and not having midline point tenderness in that area of the spine. TLSO brace does not seem necessary though may reconsider depending on PT/OT findings. -  Continue home prn ibuprofen for mild-moderate pain. Hyponatremia  -  Mild. -  Hold lisinopril. Fluid restrict to 1.5L/day.

## 2021-05-11 NOTE — ED PROVIDER NOTES
EKG interpretation:  Normal sinus rhythm, rate 65, normal axis, QTC within normal limits, no acute ST changes or T wave inversions compared with prior dated 10/29/2019     Checo Cooney MD  05/11/21 4124    I independently performed a history and physical on Terri London. All diagnostic, treatment, and disposition decisions were made by myself in conjunction with the advanced practice provider. Patient with frequent falls, unsafe to ambulate. Also found to have hyponatremia with sodium of 123. Questionable T12 endplate fracture. Patient does not appear to have significant midline tenderness in this region. Plan for hospitalist admission and likely placement. For further details of Charlotte Taylor's emergency department encounter, please see JAZZMINE Senior's documentation.        Checo Cooney MD  05/11/21 0290

## 2021-05-12 LAB
ANION GAP SERPL CALCULATED.3IONS-SCNC: 12 MMOL/L (ref 3–16)
ANION GAP SERPL CALCULATED.3IONS-SCNC: 9 MMOL/L (ref 3–16)
BUN BLDV-MCNC: 4 MG/DL (ref 7–20)
BUN BLDV-MCNC: 6 MG/DL (ref 7–20)
CALCIUM SERPL-MCNC: 8.8 MG/DL (ref 8.3–10.6)
CALCIUM SERPL-MCNC: 9.2 MG/DL (ref 8.3–10.6)
CHLORIDE BLD-SCNC: 90 MMOL/L (ref 99–110)
CHLORIDE BLD-SCNC: 94 MMOL/L (ref 99–110)
CO2: 24 MMOL/L (ref 21–32)
CO2: 25 MMOL/L (ref 21–32)
CREAT SERPL-MCNC: 0.6 MG/DL (ref 0.6–1.2)
CREAT SERPL-MCNC: <0.5 MG/DL (ref 0.6–1.2)
GFR AFRICAN AMERICAN: >60
GFR AFRICAN AMERICAN: >60
GFR NON-AFRICAN AMERICAN: >60
GFR NON-AFRICAN AMERICAN: >60
GLUCOSE BLD-MCNC: 148 MG/DL (ref 70–99)
GLUCOSE BLD-MCNC: 85 MG/DL (ref 70–99)
MAGNESIUM: 1.9 MG/DL (ref 1.8–2.4)
POTASSIUM SERPL-SCNC: 4.2 MMOL/L (ref 3.5–5.1)
POTASSIUM SERPL-SCNC: 4.7 MMOL/L (ref 3.5–5.1)
SARS-COV-2, PCR: NOT DETECTED
SODIUM BLD-SCNC: 127 MMOL/L (ref 136–145)
SODIUM BLD-SCNC: 127 MMOL/L (ref 136–145)

## 2021-05-12 PROCEDURE — 97530 THERAPEUTIC ACTIVITIES: CPT

## 2021-05-12 PROCEDURE — 97116 GAIT TRAINING THERAPY: CPT

## 2021-05-12 PROCEDURE — 97166 OT EVAL MOD COMPLEX 45 MIN: CPT

## 2021-05-12 PROCEDURE — 83735 ASSAY OF MAGNESIUM: CPT

## 2021-05-12 PROCEDURE — 6370000000 HC RX 637 (ALT 250 FOR IP)

## 2021-05-12 PROCEDURE — 6360000002 HC RX W HCPCS: Performed by: INTERNAL MEDICINE

## 2021-05-12 PROCEDURE — 36415 COLL VENOUS BLD VENIPUNCTURE: CPT

## 2021-05-12 PROCEDURE — 97162 PT EVAL MOD COMPLEX 30 MIN: CPT

## 2021-05-12 PROCEDURE — 2580000003 HC RX 258: Performed by: INTERNAL MEDICINE

## 2021-05-12 PROCEDURE — 97535 SELF CARE MNGMENT TRAINING: CPT

## 2021-05-12 PROCEDURE — 6370000000 HC RX 637 (ALT 250 FOR IP): Performed by: INTERNAL MEDICINE

## 2021-05-12 PROCEDURE — 80048 BASIC METABOLIC PNL TOTAL CA: CPT

## 2021-05-12 PROCEDURE — 97110 THERAPEUTIC EXERCISES: CPT

## 2021-05-12 PROCEDURE — 96372 THER/PROPH/DIAG INJ SC/IM: CPT

## 2021-05-12 RX ORDER — DEXTROSE AND SODIUM CHLORIDE 5; .9 G/100ML; G/100ML
INJECTION, SOLUTION INTRAVENOUS CONTINUOUS
Status: DISCONTINUED | OUTPATIENT
Start: 2021-05-12 | End: 2021-05-13

## 2021-05-12 RX ORDER — LISINOPRIL 20 MG/1
20 TABLET ORAL DAILY
Status: ON HOLD | COMMUNITY
End: 2021-05-17 | Stop reason: HOSPADM

## 2021-05-12 RX ADMIN — ASPIRIN 81 MG: 81 TABLET, CHEWABLE ORAL at 10:10

## 2021-05-12 RX ADMIN — DONEPEZIL HYDROCHLORIDE 10 MG: 5 TABLET, FILM COATED ORAL at 20:36

## 2021-05-12 RX ADMIN — AMLODIPINE BESYLATE 5 MG: 5 TABLET ORAL at 10:10

## 2021-05-12 RX ADMIN — Medication: at 15:28

## 2021-05-12 RX ADMIN — DEXTROSE AND SODIUM CHLORIDE: 5; 900 INJECTION, SOLUTION INTRAVENOUS at 17:22

## 2021-05-12 RX ADMIN — ENOXAPARIN SODIUM 40 MG: 40 INJECTION SUBCUTANEOUS at 10:10

## 2021-05-12 RX ADMIN — IBUPROFEN 400 MG: 400 TABLET, FILM COATED ORAL at 15:16

## 2021-05-12 RX ADMIN — CALCIUM 500 MG: 500 TABLET ORAL at 10:10

## 2021-05-12 RX ADMIN — Medication 3 MG: at 20:36

## 2021-05-12 NOTE — PROGRESS NOTES
Pt admitted to room 337 via stretcher by ED RN in stable condition. VSS. Pt alert and oriented to place, time, and person, disoriented to situation. Pt oriented to room and call light, call light and bedside table with in reach, bed check in place, wheels locked, bed in lowest position, nonskid footwear in place. Pt provided with food and water following diet order. Pt educated to call out if needing assistance.

## 2021-05-12 NOTE — CONSULTS
Patient seen and examined, consult note dictated. Assessment and Plan:    Hyponatremia:  Multifactorial and likely secondary to intravascular volume depletion, along with decreased solute intake. Serum sodium trending up nicely with current management. - Continue gentle volume expansion using isotonic fluids. - Apply free water restriction to 32 ounces per day. - Serial labs to avoid overcorrection.

## 2021-05-12 NOTE — PROGRESS NOTES
Hospitalist Progress Note      PCP: Tanmay Hussein MD    Date of Admission: 5/11/2021    Chief Complaint: Weakness, falls    Hospital Course: Admitted with progressive weakness and multiple falls. Noted a small asymptomatic T12 compression fracture. Hyponatremia seems to be a significant finding. Subjective: No chest pain, no shortness of breath, no nausea or vomiting. No abdominal pain. Medications:  Reviewed    Infusion Medications    dextrose 5 % and 0.9 % NaCl      sodium chloride       Scheduled Medications    enoxaparin  40 mg Subcutaneous Daily    amLODIPine  5 mg Oral Daily    aspirin  81 mg Oral Daily    calcium elemental  500 mg Oral Daily    donepezil  10 mg Oral Nightly     PRN Meds: sodium chloride flush, sodium chloride, promethazine **OR** ondansetron, acetaminophen **OR** acetaminophen, melatonin, ibuprofen      Intake/Output Summary (Last 24 hours) at 5/12/2021 1443  Last data filed at 5/12/2021 1155  Gross per 24 hour   Intake 300 ml   Output 400 ml   Net -100 ml       Physical Exam Performed:    /68   Pulse 73   Temp 97.6 °F (36.4 °C) (Oral)   Resp 16   Ht 4' 6\" (1.372 m)   Wt 114 lb 4.8 oz (51.8 kg)   SpO2 97%   BMI 27.56 kg/m²     General appearance: No apparent distress, appears stated age. Pleasant and cooperative  HEENT: Pupils equal, round, and reactive to light. Conjunctivae/corneas clear. Neck: Supple, with full range of motion. No jugular venous distention. Trachea midline. Respiratory:  Normal respiratory effort. Clear to auscultation, bilaterally without Rales/Wheezes/Rhonchi. Cardiovascular: Regular rate and rhythm with normal S1/S2 without murmurs, rubs or gallops. Abdomen: Soft, non-tender, non-distended with normal bowel sounds. Musculoskeletal: No clubbing, cyanosis or edema bilaterally. Full range of motion without deformity. Skin: Skin color, texture, turgor normal.  No rashes or lesions.   Neurologic:  Neurovascularly intact without any focal sensory/motor deficits. Cranial nerves: II-XII intact, grossly non-focal.  Psychiatric: Alert and oriented. Thought content and insight appear superficial and limited  Capillary Refill: Brisk,3 seconds, normal   Peripheral Pulses: +2 palpable, equal bilaterally       Labs:   Recent Labs     05/11/21  1507   WBC 7.2   HGB 12.2   HCT 35.6*        Recent Labs     05/11/21  1507 05/12/21  0646   * 127*   K 4.8 4.2   CL 90* 94*   CO2 26 24   BUN 4* 4*   CREATININE <0.5* <0.5*   CALCIUM 9.2 8.8     Recent Labs     05/11/21  1507   AST 19   ALT 11   BILITOT 0.3   ALKPHOS 70     No results for input(s): INR in the last 72 hours. Recent Labs     05/11/21  1507   TROPONINI <0.01       Urinalysis:      Lab Results   Component Value Date    NITRU Negative 05/11/2021    WBCUA None seen 10/29/2019    RBCUA None seen 10/29/2019    BLOODU Negative 05/11/2021    SPECGRAV 1.015 05/11/2021    GLUCOSEU Negative 05/11/2021       Radiology:  XR ANKLE LEFT (MIN 3 VIEWS)   Final Result   No acute osseous abnormality evident. Soft tissue edema, presumably reactive edema, contusion and/or sprain. Follow up imaging is recommended if pain persists or worsens. XR CHEST PORTABLE   Final Result   No acute cardiopulmonary process         CT HEAD WO CONTRAST   Final Result   No acute traumatic intracranial abnormality      Atrophy and small-vessel ischemic change      Chronic appearing left sphenoid sinus disease         CT CERVICAL SPINE WO CONTRAST   Final Result   No acute osseous abnormality      Multilevel degenerative disc disease and facet arthropathy throughout. CT LUMBAR SPINE WO CONTRAST   Final Result   Age indeterminate wedging superior endplate Z04 left. Correlate with point   tenderness. Scattered levels of degenerative disc disease. 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                  Assessment/Plan:    C/Ashley Olson 7267 Problems    Diagnosis    Generalized weakness [R53.1]    Dementia (HCC) [F03.90]    Frequent falls [R29.6]    DDD (degenerative disc disease), lumbar [M51.36]    DDD (degenerative disc disease), cervical [M50.30]    Hyponatremia [E87.1]     PLAN:    Generalized weakness  Noted hyponatremia, which might explain weakness and falls. Otherwise, PT OT evaluation ordered. Patient and family are interested in skilled nursing facility placement, which can be arranged. Referrals are made. Awaiting response. Hyponatremia  123 on arrival.  127 today. May explain generalized weakness and falls  Nephrology consulted. Patient is not on diuretics and does not have excessive alcohol drinking history. T12 compression fracture  Asymptomatic. Treatment does not seem necessary except for PT OT. Hypertension  Holding lisinopril due to patient's blood pressure being on the low side. Continue amlodipine. Intervention  Continue donepezil    Discussed with patient in the room. Discussed with daughter over the phone in the patient's presence. DVT Prophylaxis: Lovenox  Diet: DIET CARDIAC; Daily Fluid Restriction: 1000 ml  Code Status: Full Code    PT/OT Eval Status: In process    Dispo -skilled nursing facility once stabilized and accepted.     Saud Mckinnon MD

## 2021-05-12 NOTE — PROGRESS NOTES
Pt alert and oriented to person, place, and time, VSS. Shift assessment completed and documented. Pt denies any needs at this time. Bed locked and in lowest position. Call light within reach.

## 2021-05-12 NOTE — PROGRESS NOTES
Physical Therapy    Facility/Department: [facility] C3 TELE/MED SURG/ONC  Initial Assessment and treatment    NAME: Kennard Habermann  : 1945  MRN: [id number]    Date of Service: 2021    Discharge Recommendations:  Subacute/Skilled Nursing Facility   PT Equipment Recommendations  Equipment Needed: (defer)    Assessment   Body structures, Functions, Activity limitations: Decreased functional mobility ; Decreased balance;Decreased safe awareness;Decreased cognition  Assessment: Pt referred for PT evaluation during current hospial stay with a diagnosis of general weakness. Pt is currently functioning below baseline, requiring CGA for transfers and Arley for gait with RW, with forward flexion at trunk with lean to left as well, increased unsteadiness at turns with LOB, Arley to correct. Pt demo's poor safety awareness with mobility. Pt would benefit from skilled acute PT to address deficits. Recommend SNF at [place] from acute setting. Treatment Diagnosis: impaired mobility  Prognosis: Good  Decision Making: Medium Complexity  PT Education: Goals;Gait Training;PT Role;Disease Specific Education;Plan of Care;Home Exercise Program;Functional Mobility Training;Transfer Training  Patient Education: pt verbalized understanding of importance of OOB activity, needs reinforcement  Barriers to Learning: cognition, easliy distractible  REQUIRES PT FOLLOW UP: Yes  Activity Tolerance  Activity Tolerance: Patient limited by cognitive status  Activity Tolerance: /68   HR 73  SpO2 97% on RA       Patient Diagnosis(es): The primary encounter diagnosis was Frequent falls. Diagnoses of Compression fracture of T12 vertebra, initial encounter ([facility] [address] and Hyponatremia were also pertinent to this visit. has a past medical history of Falls and Hypertension. has a past surgical history that includes shoulder surgery; knee surgery; and Hysterectomy.     Restrictions  Restrictions/Precautions  Restrictions/Precautions: General Precautions, Fall Risk  Position Activity Restriction  Other position/activity restrictions: up in chair  Vision/Hearing  Vision: Impaired  Vision Exceptions: Wears glasses for reading  Hearing: Within functional limits     Subjective  General  Chart Reviewed: Yes  Patient assessed for rehabilitation services?: Yes  Response To Previous Treatment: Patient with no complaints from previous session.   Family / Caregiver Present: No  Referring Practitioner: Rhett Maxwell MD  Referral Date : 05/11/21  Diagnosis: generalized weakness  Follows Commands: Within Functional Limits  General Comment  Comments: pt sitting up in chair upon entry, RN cleared pt for therapy  Subjective  Subjective: Pt agreeable to PT  Pain Screening  Patient Currently in Pain: Denies     Pre Treatment Pain Screening  Intervention List: Patient able to continue with treatment    Orientation  Orientation  Overall Orientation Status: Within Functional Limits  Social/Functional History  Social/Functional History  Lives With: Friend(s)(works 6 days/wk)  Type of Home: House  Home Layout: One level  Home Access: Stairs to enter without rails  Entrance Stairs - Number of Steps: 3  Bathroom Shower/Tub: Walk-in shower  Bathroom Toilet: Standard  Bathroom Equipment: Grab bars in shower, Built-in shower seat, Grab bars around toilet  Home Equipment: 4 wheeled walker  Receives Help From: Personal care attendant(2 days per week 10-2pm)  ADL Assistance: Needs assistance(typically independent)  Homemaking Responsibilities: Yes  Ambulation Assistance: Independent(4WW)  Active : No  Leisure & Hobbies: go out to eat  Additional Comments: reports only 1 recent falls         Objective          AROM RLE (degrees)  RLE AROM: WFL  AROM LLE (degrees)  LLE AROM : WFL  Strength RLE  Strength RLE: WFL  Strength LLE  Strength LLE: WFL        Bed mobility  Supine to Sit: Unable to assess(pt up in chair at entry and at EOS)  Sit to Supine: Unable to assess  Scooting: Supervision(to scoot forward and back in chair)  Transfers  Sit to Stand: Contact guard assistance(cues for hand placement)  Stand to sit: Contact guard assistance  Ambulation  Ambulation?: Yes  Ambulation 1  Surface: level tile  Device: Rolling Walker  Assistance: Minimal assistance  Quality of Gait: moderately unsteady but no overt LOB for straight line ambulation, LOB with turns, forward flexed at trunk with lean to left, poor safety awareness  Gait Deviations: Shuffles;Decreased step length;Decreased step height  Distance: 80 ft     Balance  Posture: Fair  Sitting - Static: Good  Sitting - Dynamic: Good;-  Standing - Static: Fair;+  Standing - Dynamic: Fair  Exercises  Gluteal Sets: x 10 B  Hip Flexion: x 10 B  Hip Abduction: x 10 B clamshells  Knee Long Arc Quad: x 10 B  Ankle Pumps: x 20 B     Plan   Plan  Times per week: 3-5  Current Treatment Recommendations: Strengthening, Home Exercise Program, ROM, Balance Training, Endurance Training, Functional Mobility Training, Transfer Training, Gait Training  Safety Devices  Type of devices:  All fall risk precautions in place, Left in chair, Call light within reach, Chair alarm in place, Nurse notified, Gait belt, Patient at risk for falls      AM-PAC Score  AM-PAC Inpatient Mobility Raw Score : 16 (05/12/21 1301)  AM-PAC Inpatient T-Scale Score : 40.78 (05/12/21 1301)  Mobility Inpatient CMS 0-100% Score: 54.16 (05/12/21 1301)  Mobility Inpatient CMS G-Code Modifier : CK (05/12/21 1301)          Goals  Short term goals  Time Frame for Short term goals: 5/18/21 unless noted  Short term goal 1: PT will perform bed mobility with SBA by 5/17/21  Short term goal 2: Pt will perform transfers with SBA  Short term goal 3: Pt will ambulate 90 ft with RW and CGA  Patient Goals   Patient goals : \"to go home today\"       Therapy Time   Individual Concurrent Group Co-treatment   Time In 0926         Time Out 1000         Minutes 34         Timed Code Treatment Minutes: 24 Minutes    If pt is discharged prior to next therapy session, this note will serve as discharge summary.     Ole Constantino, PT

## 2021-05-12 NOTE — PROGRESS NOTES
Consult called to nephrology[facility].  Unit secretary there told me that Karl Mejia will be the consulting MD. Erin Bird, 5/12/21, [phone]

## 2021-05-12 NOTE — PROGRESS NOTES
Perfect serve message sent to NP, \"Pt in COViD precautions looks like test was just ordered for placement. Are we able to d/c precautions since just for placement or do they have to remain until PCR returns thank you. \", awaiting response.

## 2021-05-12 NOTE — PLAN OF CARE
Problem: Falls - Risk of:  Goal: Will remain free from falls  Description: Will remain free from falls  Outcome: Ongoing   Pt remains free from falls during this shift. Bed in lowest position and wheels locked. Call light within reach. Bedside table within reach. Pt educated how to use call light and to call out for assistance. Bed check and nonskid footwear in place.

## 2021-05-12 NOTE — CARE COORDINATION
CM  Spoke with Crystal Gunter, patients POA. Stated patient has had nerve damage on right side for years. Has been using walker. Has fallen twice in past few days. Cant prepare her own foods but can feed self. Needs assistance with dressing. Has been fully vaccinated. Tried assisted living last year. Was not happy there due to covid restrictions, moved back home. Per Crystal Gunter, would like referral to [facility]. 2) [facility]. Spoke with admissions at the [facility].  Viola Schneider RN

## 2021-05-12 NOTE — PROGRESS NOTES
Occupational Therapy   Occupational Therapy Initial Assessment/Treatment   Date: 2021   Patient Name: Nola Suárez  MRN: [id number]     : 1945    Date of Service: 2021    Discharge Recommendations:  Subacute/Skilled Nursing Facility  OT Equipment Recommendations  Other: defer    Assessment   Performance deficits / Impairments: Decreased functional mobility ; Decreased safe awareness;Decreased balance;Decreased ADL status; Decreased strength;Decreased endurance;Decreased cognition    Assessment: Pt 69 yo female functioning with deficits in the areas listed above following increased falls. Pt is a questionable historian but reports she is alone at home most of the day. Pt has an aide that helps with transportation 2 days/wk. Pt required frequent cues for redirection througout the session and demo'd lack of insight into deficits. Pt educated on increased safety awareness and importance of continued acitivty. Pt is not safe to return home at this time and will need skilled rehab at discharge to improve functional levels. Disease Specific Education: Pt educated on importance of OOB mobility, prevention of complications of bedrest, and general safety during hospitalization. Pt verbalized understanding    Prognosis: Good  Decision Making: Medium Complexity  OT Education: OT Role;Precautions; ADL Adaptive Strategies;Transfer Training  Barriers to Learning: cognition  REQUIRES OT FOLLOW UP: Yes  Activity Tolerance  Activity Tolerance: Patient Tolerated treatment well;Treatment limited secondary to decreased cognition  Activity Tolerance: /74 HR 60 O2 100%  Safety Devices  Safety Devices in place: Yes  Type of devices: Call light within reach;Nurse notified; Chair alarm in place; Left in chair;Gait belt           Patient Diagnosis(es): The primary encounter diagnosis was Frequent falls.  Diagnoses of Compression fracture of T12 vertebra, initial encounter ([facility] [address] and Hyponatremia were also pertinent to this visit. has a past medical history of Falls and Hypertension. has a past surgical history that includes shoulder surgery; knee surgery; and Hysterectomy.            Restrictions  Restrictions/Precautions  Restrictions/Precautions: General Precautions, Fall Risk  Position Activity Restriction  Other position/activity restrictions: up in chair    Subjective   General  Chart Reviewed: Yes  Patient assessed for rehabilitation services?: Yes  Family / Caregiver Present: No  Referring Practitioner: Estevan Reyes MD  Diagnosis: weakness, falls  Subjective  Subjective: Pt agreeable to therapy  General Comment  Comments: RN approved therapy     Social/Functional History  Social/Functional History  Lives With: Friend(s)(works 6 days/wk)  Type of Home: House  Home Layout: One level  Home Access: Stairs to enter without rails  Entrance Stairs - Number of Steps: 3  Bathroom Shower/Tub: Walk-in shower  Bathroom Toilet: Standard  Bathroom Equipment: Grab bars in shower, Built-in shower seat, Grab bars around toilet  Home Equipment: 4 wheeled walker  Receives Help From: Personal care attendant(2 days per week 10-2pm)  ADL Assistance: Needs assistance(typically independent)  Homemaking Responsibilities: Yes  Ambulation Assistance: Independent(4WW)  Active : No  Leisure & Hobbies: go out to eat  Additional Comments: reports only 1 recent falls       Objective   Vision: Impaired  Vision Exceptions: Wears glasses for reading  Hearing: Within functional limits    Orientation  Overall Orientation Status: Within Functional Limits  Observation/Palpation  Posture: Fair  Observation: forward flexion on walker  Balance  Sitting Balance: Supervision  Standing Balance: Contact guard assistance(RW)  Standing Balance  Time: 5 minutes  Activity: functional mobility in room in preparation for house hold distances  Functional Mobility  Functional - Mobility Device: Rolling Walker  Activity: Other  Assist Level: Minimal assistance  Functional Mobility Comments: assist with safe RW management  ADL  Feeding: Setup  Grooming: Stand by assistance;Verbal cueing; Increased time to complete(seated in chair)  Toileting: Dependent/Total(purewick)  Tone RUE  RUE Tone: Normotonic  Tone LUE  LUE Tone: Normotonic  Coordination  Movements Are Fluid And Coordinated: Yes     Bed mobility  Supine to Sit: Contact guard assistance(HOB elevated, use of bedrail)  Sit to Supine: Unable to assess(up in chair at end of session)  Transfers  Sit to stand: Contact guard assistance  Stand to sit: Contact guard assistance     Cognition  Overall Cognitive Status: Exceptions  Arousal/Alertness: Appropriate responses to stimuli  Following Commands: Follows one step commands consistently  Attention Span: Attends with cues to redirect  Safety Judgement: Decreased awareness of need for assistance  Problem Solving: Assistance required to generate solutions;Assistance required to identify errors made  Insights: Decreased awareness of deficits  Cognition Comment: mod verbal cues to redirect conservation        Sensation  Overall Sensation Status: WFL        LUE AROM (degrees)  LUE AROM : WFL  RUE AROM (degrees)  RUE AROM : WFL  LUE Strength  L Hand General: 4/5  RUE Strength  R Hand General: 4/5                   Plan   Plan  Times per week: 3-5x/wk  Current Treatment Recommendations: Balance Training, Functional Mobility Training, Safety Education & Training, Self-Care / ADL, Endurance Training             AM-PAC Score        -[facility] Inpatient Daily Activity Raw Score: 14 (05/12/21 1439)  AM-PAC Inpatient ADL T-Scale Score : 33.39 (05/12/21 1439)  ADL Inpatient CMS 0-100% Score: 59.67 (05/12/21 [facility]9)  ADL Inpatient CMS G-Code Modifier : CK (05/12/21 1439)    Goals  Short term goals  Time Frame for Short term goals: 1 week (5/19/21)  Short term goal 1: Pt will complete LB dressing with s.   Short term goal 2: Pt will complete 15 reps of BUE exercises for increased

## 2021-05-13 PROBLEM — R53.1 WEAKNESS: Status: ACTIVE | Noted: 2021-05-13

## 2021-05-13 LAB
ANION GAP SERPL CALCULATED.3IONS-SCNC: 11 MMOL/L (ref 3–16)
BUN BLDV-MCNC: 3 MG/DL (ref 7–20)
CALCIUM SERPL-MCNC: 8.8 MG/DL (ref 8.3–10.6)
CHLORIDE BLD-SCNC: 97 MMOL/L (ref 99–110)
CO2: 23 MMOL/L (ref 21–32)
CREAT SERPL-MCNC: <0.5 MG/DL (ref 0.6–1.2)
GFR AFRICAN AMERICAN: >60
GFR NON-AFRICAN AMERICAN: >60
GLUCOSE BLD-MCNC: 106 MG/DL (ref 70–99)
HCT VFR BLD CALC: 35.6 % (ref 36–48)
HEMOGLOBIN: 12.2 G/DL (ref 12–16)
MAGNESIUM: 1.9 MG/DL (ref 1.8–2.4)
MCH RBC QN AUTO: 30.5 PG (ref 26–34)
MCHC RBC AUTO-ENTMCNC: 34.1 G/DL (ref 31–36)
MCV RBC AUTO: 89.5 FL (ref 80–100)
PDW BLD-RTO: 12.3 % (ref 12.4–15.4)
PLATELET # BLD: 231 K/UL (ref 135–450)
PMV BLD AUTO: 7.2 FL (ref 5–10.5)
POTASSIUM SERPL-SCNC: 4 MMOL/L (ref 3.5–5.1)
RBC # BLD: 3.98 M/UL (ref 4–5.2)
SODIUM BLD-SCNC: 131 MMOL/L (ref 136–145)
WBC # BLD: 4.5 K/UL (ref 4–11)

## 2021-05-13 PROCEDURE — 85027 COMPLETE CBC AUTOMATED: CPT

## 2021-05-13 PROCEDURE — 6370000000 HC RX 637 (ALT 250 FOR IP): Performed by: INTERNAL MEDICINE

## 2021-05-13 PROCEDURE — 2580000003 HC RX 258: Performed by: INTERNAL MEDICINE

## 2021-05-13 PROCEDURE — 6360000002 HC RX W HCPCS: Performed by: INTERNAL MEDICINE

## 2021-05-13 PROCEDURE — 96372 THER/PROPH/DIAG INJ SC/IM: CPT

## 2021-05-13 PROCEDURE — 99205 OFFICE O/P NEW HI 60 MIN: CPT | Performed by: PSYCHIATRY & NEUROLOGY

## 2021-05-13 PROCEDURE — G0378 HOSPITAL OBSERVATION PER HR: HCPCS

## 2021-05-13 PROCEDURE — 97530 THERAPEUTIC ACTIVITIES: CPT

## 2021-05-13 PROCEDURE — 96361 HYDRATE IV INFUSION ADD-ON: CPT

## 2021-05-13 PROCEDURE — 97110 THERAPEUTIC EXERCISES: CPT

## 2021-05-13 PROCEDURE — 97535 SELF CARE MNGMENT TRAINING: CPT

## 2021-05-13 PROCEDURE — 83735 ASSAY OF MAGNESIUM: CPT

## 2021-05-13 PROCEDURE — 80048 BASIC METABOLIC PNL TOTAL CA: CPT

## 2021-05-13 RX ADMIN — IBUPROFEN 400 MG: 400 TABLET, FILM COATED ORAL at 04:52

## 2021-05-13 RX ADMIN — Medication 3 MG: at 20:18

## 2021-05-13 RX ADMIN — ENOXAPARIN SODIUM 40 MG: 40 INJECTION SUBCUTANEOUS at 09:01

## 2021-05-13 RX ADMIN — CALCIUM 500 MG: 500 TABLET ORAL at 09:01

## 2021-05-13 RX ADMIN — DONEPEZIL HYDROCHLORIDE 10 MG: 5 TABLET, FILM COATED ORAL at 20:18

## 2021-05-13 RX ADMIN — DEXTROSE AND SODIUM CHLORIDE: 5; 900 INJECTION, SOLUTION INTRAVENOUS at 10:21

## 2021-05-13 ASSESSMENT — PAIN SCALES - GENERAL: PAINLEVEL_OUTOF10: 9

## 2021-05-13 NOTE — PROGRESS NOTES
Pt alert and oriented with confusion, VSS. Shift assessment completed and documented. Pt denies any needs at this time. Bed locked and in lowest position. Call light within reach.

## 2021-05-13 NOTE — PROGRESS NOTES
Hospitalist Progress Note      PCP: Nyla Muñoz MD    Date of Admission: 5/11/2021    Chief Complaint: Weakness, falls    Hospital Course: Admitted with progressive weakness and multiple falls. Noted a small asymptomatic T12 compression fracture. Hyponatremia seems to be a significant finding. Patient evaluated by nephrology. Sodium level is better today. Noted decreased oral intake recently. Patient was agreeable to go to skilled nursing facility yesterday. Seems to have changed her mind today. I am concerned about patient's ability to make own decisions. Subjective: No chest pain, no shortness of breath, no nausea or vomiting. No abdominal pain. No new complaints. No back pain. Medications:  Reviewed    Infusion Medications    dextrose 5 % and 0.9 % NaCl 60 mL/hr at 05/13/21 1021    sodium chloride       Scheduled Medications    enoxaparin  40 mg Subcutaneous Daily    amLODIPine  5 mg Oral Daily    aspirin  81 mg Oral Daily    calcium elemental  500 mg Oral Daily    donepezil  10 mg Oral Nightly     PRN Meds: sodium chloride flush, sodium chloride, promethazine **OR** ondansetron, acetaminophen **OR** acetaminophen, melatonin, ibuprofen      Intake/Output Summary (Last 24 hours) at 5/13/2021 1102  Last data filed at 5/13/2021 1025  Gross per 24 hour   Intake 660 ml   Output 1900 ml   Net -1240 ml       Physical Exam Performed:    /81   Pulse 67   Temp 97.7 °F (36.5 °C) (Oral)   Resp 16   Ht 4' 6\" (1.372 m)   Wt 115 lb 11.2 oz (52.5 kg)   SpO2 98%   BMI 27.90 kg/m²     General appearance: No apparent distress, appears stated age. Verbally uncooperative today with no behavioral issues otherwise. HEENT: Pupils equal, round, and reactive to light. Conjunctivae/corneas clear. Neck: Supple, with full range of motion. No jugular venous distention. Trachea midline. Respiratory:  Normal respiratory effort.  Clear to auscultation, bilaterally without Rales/Wheezes/Rhonchi. Cardiovascular: Regular rate and rhythm with normal S1/S2 without murmurs, rubs or gallops. Abdomen: Soft, non-tender, non-distended with normal bowel sounds. Musculoskeletal: No clubbing, cyanosis or edema bilaterally. Full range of motion without deformity. Skin: Skin color, texture, turgor normal.  No rashes or lesions. Neurologic:  Neurovascularly intact without any focal sensory/motor deficits. Cranial nerves: II-XII intact, grossly non-focal.  Psychiatric: Alert and oriented. Thought content and insight appear superficial and limited. Similar to yesterday. Capillary Refill: Brisk, 3 seconds, normal   Peripheral Pulses: +2 palpable, equal bilaterally     Examined the patient today (05/13/21). Physical exam is similar to yesterday (5/12). Labs:   Recent Labs     05/11/21  1507 05/13/21  0640   WBC 7.2 4.5   HGB 12.2 12.2   HCT 35.6* 35.6*    231     Recent Labs     05/12/21  0646 05/12/21  1745 05/13/21  0640   * 127* 131*   K 4.2 4.7 4.0   CL 94* 90* 97*   CO2 24 25 23   BUN 4* 6* 3*   CREATININE <0.5* 0.6 <0.5*   CALCIUM 8.8 9.2 8.8     Recent Labs     05/11/21  1507   AST 19   ALT 11   BILITOT 0.3   ALKPHOS 70     No results for input(s): INR in the last 72 hours. Recent Labs     05/11/21  1507   TROPONINI <0.01       Urinalysis:      Lab Results   Component Value Date    NITRU Negative 05/11/2021    WBCUA None seen 10/29/2019    RBCUA None seen 10/29/2019    BLOODU Negative 05/11/2021    SPECGRAV 1.015 05/11/2021    GLUCOSEU Negative 05/11/2021       Radiology:  XR ANKLE LEFT (MIN 3 VIEWS)   Final Result   No acute osseous abnormality evident. Soft tissue edema, presumably reactive edema, contusion and/or sprain. Follow up imaging is recommended if pain persists or worsens.          XR CHEST PORTABLE   Final Result   No acute cardiopulmonary process         CT HEAD WO CONTRAST   Final Result   No acute traumatic intracranial abnormality Atrophy and small-vessel ischemic change      Chronic appearing left sphenoid sinus disease         CT CERVICAL SPINE WO CONTRAST   Final Result   No acute osseous abnormality      Multilevel degenerative disc disease and facet arthropathy throughout. CT LUMBAR SPINE WO CONTRAST   Final Result   Age indeterminate wedging superior endplate R73 left. Correlate with point   tenderness. Scattered levels of degenerative disc disease. 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 Assessment/Plan:    Active Hospital Problems    Diagnosis    Weakness [R53.1]    Generalized weakness [R53.1]    Dementia (HCC) [F03.90]    Frequent falls [R29.6]    DDD (degenerative disc disease), lumbar [M51.36]    DDD (degenerative disc disease), cervical [M50.30]    Hyponatremia [E87.1]     PLAN:    Generalized weakness  Noted hyponatremia, which might explain weakness and falls. PT OT evaluation ordered. Recommended skilled nursing facility. Family is interested in skilled nursing facility placement. Patient is not sure. Hyponatremia  Probably secondary to poor oral intake, including poor solute intake. 123 on arrival.  Up to 131 today. Nephrology input appreciated    T12 compression fracture  Asymptomatic. Treatment does not seem necessary except for PT OT. Hypertension  Holding lisinopril due to patient's blood pressure being on the low side. Continue amlodipine. Dementia  Continue donepezil  Patient is not aggressive or agitated. However, verbally uncooperative and is not agreeing to go to skilled nursing facility today, in contrast to our conversation yesterday. I will ask psychiatry to see to help determine if patient is able to make own decisions. I suspect not. Discussed with the patient. Not cooperative.       DVT Prophylaxis: Lovenox  Diet: DIET CARDIAC; Daily Fluid Restriction: 1000 ml  Code Status: Full Code    PT/OT Abran

## 2021-05-13 NOTE — PROGRESS NOTES
This RN was in pt room while pt was on phone call with Speakerphone to friend, Li Mccoy. Pt states that she lives with Li Mccoy. Li Mccoy seemed very upset that pt might be sent to SNF stating to pt that she should not let them and that he can take care of her. Li Mccoy stated that pt's daughter Haven Frye wanted to put you there again. \" Pt told this rn that she thought she would be going to SNF and then to another facility to live at. Pt stated that daughter does not like Li Mccoy and that she wants her to go to SNF and then another facility so that she will not stay with him again. Pt states that she though Sravani Brayll me. \" Pt stated that she is alone from before 8 am to 5:30 pm when Li Mccoy is away at work. Li Mccoy states over phone \"I am your POA and they can't put you there, I can take care of you\" Pts chart lists that her adult daughter is her medical POA. Pt states that she would not mind going to SNF again and would like \"not being alone all day. \" Pt seems upset as Li Mccoy calls her multiple times while this RN is in room.

## 2021-05-13 NOTE — PROGRESS NOTES
Physical Therapy  Facility/Department: [facility] C3 TELE/MED SURG/ONC  Daily Treatment Note  NAME: Allen Ding  : 1945  MRN: [id number]    Date of Service: 2021    Discharge Recommendations:  Subacute/Skilled Nursing Facility   PT Equipment Recommendations  Equipment Needed: No  Other: Defer to facility. Pt owns RW. Assessment   Body structures, Functions, Activity limitations: Decreased functional mobility ; Decreased balance;Decreased safe awareness;Decreased cognition  Assessment: PT tx session focused on functional transfers and household distance ambulation with RW. Pt performed multiple sit<>stand transfers from various surfaces as her aide Phoebe Umanzor reports this is most difficult for her at home. Once pt is on feet, able to ambulate with RW and CGA without LOB this date. Pt is now reporting that she would prefer to d/c home with Savana Benitez who, although he works during the day, plans to arrange for supervision/assistance for pt. Pt's Luísfernelvin Rico aide comes Tues/Thurs x 4hours. Educated pt regarding benefits of continued skilled PT at SNF setting to increase strength, reduce falls and maximize independence. Continue to recommend SNF but if / supervision/assistance can be arranged pt would be safe to return home with Husam Rico. Will continue to follow. Treatment Diagnosis: impaired mobility  Prognosis: Good  Decision Making: Medium Complexity  PT Education: Goals;Gait Training;PT Role;Disease Specific Education;Plan of Care;Home Exercise Program;Functional Mobility Training;Transfer Training  Patient Education: pt verbalized understanding of importance of OOB activity, needs reinforcement  Barriers to Learning: cognition, easliy distractible  REQUIRES PT FOLLOW UP: Yes  Activity Tolerance  Activity Tolerance: Patient limited by cognitive status; Patient Tolerated treatment well;Patient limited by fatigue  Activity Tolerance: Pretx vitals: 119/69, HR 69, SPO2 100%;  Posttx vitals: 125/71, HR 75, SpO2 99%     Patient Diagnosis(es): The primary encounter diagnosis was Frequent falls. Diagnoses of Compression fracture of T12 vertebra, initial encounter ([address] and Hyponatremia were also pertinent to this visit. has a past medical history of Falls and Hypertension. has a past surgical history that includes shoulder surgery; knee surgery; and Hysterectomy. Restrictions  Restrictions/Precautions  Restrictions/Precautions: General Precautions, Fall Risk  Position Activity Restriction  Other position/activity restrictions: up in chair, purewick, peripheral IV  Subjective   General  Chart Reviewed: Yes  Response To Previous Treatment: Patient with no complaints from previous session. Family / Caregiver Present: Yes([facility] aide Mora Purdy)  Referring Practitioner: Ольга Salinas MD  Subjective  Subjective: Pt seated in chair upon arrival, agreeable to PT tx session. Reports, \"I am going home tomorrow. \" Per RN, pt's home aide reported \"Alex\" would be coming up tomorrow to get her whether she's ready to discharge or not. General Comment  Comments: RN cleared for therapy  Pain Screening  Patient Currently in Pain: Denies  Vital Signs  Patient Currently in Pain: Denies       Orientation  Orientation  Overall Orientation Status: Within Functional Limits  Cognition   Cognition  Overall Cognitive Status: Exceptions  Arousal/Alertness: Appropriate responses to stimuli  Following Commands:  Follows one step commands consistently  Attention Span: Attends with cues to redirect  Safety Judgement: Decreased awareness of need for assistance  Problem Solving: Assistance required to generate solutions;Assistance required to identify errors made  Insights: Decreased awareness of deficits  Cognition Comment: mod verbal cues to redirect conservation  Objective   Bed mobility  Supine to Sit: Unable to assess  Sit to Supine: Unable to assess  Scooting: Supervision(At edge of seat to achieve foot flat.)  Comment: Pt seated in chair upon arrival and at end of session. Transfers  Sit to Stand: Minimal Assistance;Contact guard assistance  Stand to sit: Minimal Assistance;Contact guard assistance  Comment: Multiple sit<>stand transfers performed from various surfaces including recliner, lower toilet and couch surface. Pt required min A for lift and immediate balance, noted B knee buckle as pt eccentrically controlled stand>sit which RITU Regalado reports happened at baseline. Cues for forward weight shift and use of momentum to assist.  Ambulation  Ambulation?: Yes  Ambulation 1  Surface: level tile  Device: Rolling Walker  Assistance: Contact guard assistance  Quality of Gait: Pt ambulated 20ft x 2 within room and bathroom with cues for upright posture. Course included navigating obstacles, doorways and multiple turns. No overt LOB or buckling noted during ambulation. Mildly impaired safety awareness. Distance: 20ft x 2  Stairs/Curb  Stairs?: No     Balance  Posture: Fair  Sitting - Static: Good  Sitting - Dynamic: Good;-  Standing - Static: Fair;+  Standing - Dynamic: Fair            Comment: Pt ambulated to/from bathroom, required assistance to fully doff soiled brief. Continent of urine. Able to don new brief with setup assist and increased time. Performs pericare with supervision. Performs hand hygiene standing at sink with 1-0 UE support which included reaching minimally outside of [place] with CGA for safety.        AM-PAC Score  AM-PAC Inpatient Mobility Raw Score : 17 (05/13/21 1252)  AM-PAC Inpatient T-Scale Score : 42.13 (05/13/21 1252)  Mobility Inpatient CMS 0-100% Score: 50.57 (05/13/21 1252)  Mobility Inpatient CMS G-Code Modifier : CK (05/13/21 1252)          Goals  Short term goals  Time Frame for Short term goals: 5/18/21 unless noted  Short term goal 1: PT will perform bed mobility with SBA by 5/17/21 - DNT 5/13  Short term goal 2: Pt will perform transfers with SBA - 5/13: CGA-min A  Short term goal 3: Pt will ambulate 90 ft with RW and CGA - 5/13:

## 2021-05-13 NOTE — PROGRESS NOTES
Occupational Therapy  Facility/Department: [facility] C3 TELE/MED SURG/ONC  Daily Treatment Note  NAME: Bk Muñoz  : 1945  MRN: [id number]    Date of Service: 2021    Discharge Recommendations:  Subacute/Skilled Nursing Facility  OT Equipment Recommendations  Other: defer    Assessment   Performance deficits / Impairments: Decreased functional mobility ; Decreased safe awareness;Decreased balance;Decreased ADL status; Decreased strength;Decreased endurance;Decreased cognition    Assessment: Pt pleasant and  agreeable to therapy. Pt continues to demo confusion and lack of insight into deficits. Pt required min A for transfers from low surface and mod verbal cues for safe hand placement during toilet transfer. Pt required verbal cues to redirect conversation. Pt reports she feels that increased therapy would help and is motivated to improve functional levels. Cont with POC. Disease Specific Education: Pt educated on importance of OOB mobility, prevention of complications of bedrest, and general safety during hospitalization. Pt verbalized understanding  Prognosis: Good  OT Education: OT Role;Precautions; ADL Adaptive Strategies;Transfer Training  Barriers to Learning: cognition  REQUIRES OT FOLLOW UP: Yes  Activity Tolerance  Activity Tolerance: Patient Tolerated treatment well;Treatment limited secondary to decreased cognition  Activity Tolerance: /58 HR 68 O2  99% taken from [address] [place] [address] in place: Yes  Type of devices: Call light within reach;Nurse notified; Chair alarm in place; Left in chair;Gait belt         Patient Diagnosis(es): The primary encounter diagnosis was Frequent falls. Diagnoses of Compression fracture of T12 vertebra, initial encounter ([facility] [address] and Hyponatremia were also pertinent to this visit. has a past medical history of Falls and Hypertension.    has a past surgical history that includes shoulder surgery; knee surgery; and Hysterectomy. Restrictions  Restrictions/Precautions  Restrictions/Precautions: General Precautions, Fall Risk  Position Activity Restriction  Other position/activity restrictions: up in chair, purewick, peripheral IV     Subjective   General  Chart Reviewed: Yes  Patient assessed for rehabilitation services?: Yes  Family / Caregiver Present: Yes  Referring Practitioner: Kalyan Lazo MD  Diagnosis: weakness, falls  Subjective  Subjective: Pt agreeable to therapy  General Comment  Comments: RN approved therapy  Vital Signs  Patient Currently in Pain: Denies     Orientation  Orientation  Overall Orientation Status: Within Functional Limits(lacks insight into deficits)     Objective    ADL  Grooming: Verbal cueing; Increased time to complete;Contact guard assistance(in stance at sink)  Toileting: Minimal assistance(assist with brief management, cues for attention to task)        Balance  Sitting Balance: Supervision  Standing Balance: Contact guard assistance(SW)  Standing Balance  Time: 5 minutes  Activity: bathroom mobility, standing level adls  Functional Mobility  Functional - Mobility Device: Standard Walker  Activity: To/from bathroom  Assist Level: Minimal assistance  Functional Mobility Comments: assist with safe SW management  Toilet Transfers  Toilet - Technique: Ambulating  Equipment Used: Grab bars  Toilet Transfer: Minimal assistance  Toilet Transfers Comments: increased verbal cues for hand placement and cues for initiation, increased assist for transfers from low surface  Bed mobility  Supine to Sit: Unable to assess  Sit to Supine: Unable to assess  Comment: up in chair at start/end of session  Transfers  Sit to stand: Minimal assistance  Stand to sit: Minimal assistance                       Cognition  Overall Cognitive Status: Exceptions  Arousal/Alertness: Appropriate responses to stimuli  Following Commands:  Follows one step commands consistently  Attention Span: Attends with cues to redirect  Memory: Decreased short term memory  Safety Judgement: Decreased awareness of need for assistance  Problem Solving: Assistance required to generate solutions;Assistance required to identify errors made  Insights: Decreased awareness of deficits  Cognition Comment: mod verbal cues to redirect conservation                    Type of ROM/Therapeutic Exercise  Type of ROM/Therapeutic Exercise: AROM  Comment: BUE seated  Exercises  Elbow Flexion: x10  Elbow Extension: x10  Supination: x10  Pronation: x10  Wrist Flexion: x10  Wrist Extension: x10  Grasp/Release: x10                    Plan   Plan  Times per week: 3-5x/wk  Current Treatment Recommendations: Balance Training, Functional Mobility Training, Safety Education & Training, Self-Care / ADL, Endurance Training    AM-PAC Score        AM-[facility] Inpatient Daily Activity Raw Score: 14 (05/13/21 1440)  AM-PAC Inpatient ADL T-Scale Score : 33.39 (05/13/21 1440)  ADL Inpatient CMS 0-100% Score: 59.67 (05/13/21 1440)  ADL Inpatient CMS G-Code Modifier : CK (05/13/21 1440)    Goals  Short term goals  Time Frame for Short term goals: 1 week (5/19/21)  Short term goal 1: Pt will complete LB dressing with s. Short term goal 2: Pt will complete 15 reps of BUE exercises for increased endurance and strength. onoging 10 reps of 5/13  Short term goal 3: Pt will complete toileting with SBA. ongoining required min A. Short term goal 4: Pt will complete 5 minutes of dynamic standing for adls with CGA. (1/83/91) ongoing  Patient Goals   Patient goals : \"to get back home\"       Therapy Time   Individual Concurrent Group Co-treatment   Time In 1922         Time Out 1304         Minutes 23         Timed Code Treatment Minutes: Leona Dobson 78, OT R/L    If pt is unable to be seen after this session, please let this note serve as discharge summary. Please see case management note for discharge disposition. Thank you.

## 2021-05-13 NOTE — CONSULTS
Full note dictated. Spoke to daughter Mayelin Mendoza and care giver Ayla Dai who is with the patient Tuesdays and Thursdays for the last two months. Dx:  Vascular Dementia without behavioral disturbances    Rec:  1). I do not believe she can make informed consent decisions. She is borderline and can answer lots of questions but can't tell me the names of any of her meds, what they are used for and had little ability to process new information. Her daughter reports she is the HCPOA. I think it is likely that her \"roommate\" Datacastle Pool may come and try to remove her. If we have documentation that daughter is POA may need to apprise security so that they are aware. 2).   No need for inpatient psychiatry    Shana Berumen MD

## 2021-05-13 NOTE — PROGRESS NOTES
Dr Melody Vergara added to the treatment team for Psychiatry on 5/13/2021 @ [facility]

## 2021-05-13 NOTE — PLAN OF CARE
Problem: Falls - Risk of:  Goal: Will remain free from falls  Description: Will remain free from falls  5/13/2021 1431 by Tierra Carbajal RN  Outcome: Ongoing  5/13/2021 0554 by Wan Locke RN  Outcome: Ongoing  Goal: Absence of physical injury  Description: Absence of physical injury  5/13/2021 1431 by Tierra Carbajal RN  Outcome: Ongoing  5/13/2021 0554 by Wan Locke RN  Outcome: Ongoing

## 2021-05-13 NOTE — PROGRESS NOTES
Department of Internal Medicine  Nephrology Progress Note        SUBJECTIVE:    We are following this patient for Hyponatremia. The patient was seen and examined; she feels well today with no CP, SOB, nausea or vomiting. ROS: No fever or chills. Social: No family at bedside. Physical Exam:    VITALS:  /81   Pulse 67   Temp 97.7 °F (36.5 °C) (Oral)   Resp 16   Ht 4' 6\" (1.372 m)   Wt 115 lb 11.2 oz (52.5 kg)   SpO2 98%   BMI 27.90 kg/m²     General appearance: Seems comfortable, no acute distress. Neck: Trachea midline, thyroid normal.   Lungs:  Non labored breathing, CTA to anterior auscultation. Heart:  S1S2 normal, rub or gallop. No peripheral edema. Abdomen: Soft, non-tender, no organomegaly. Skin: No lesions or rashes, warm to touch. DATA:    CBC:   Lab Results   Component Value Date    WBC 4.5 05/13/2021    RBC 3.98 05/13/2021    HGB 12.2 05/13/2021    HCT 35.6 05/13/2021    MCV 89.5 05/13/2021    MCH 30.5 05/13/2021    MCHC 34.1 05/13/2021    RDW 12.3 05/13/2021     05/13/2021    MPV 7.2 05/13/2021     BMP:    Lab Results   Component Value Date     05/13/2021    K 4.0 05/13/2021    CL 97 05/13/2021    CO2 23 05/13/2021    BUN 3 05/13/2021    LABALBU 4.0 05/11/2021    CREATININE <0.5 05/13/2021    CALCIUM 8.8 05/13/2021    GFRAA >60 05/13/2021    LABGLOM >60 05/13/2021    GLUCOSE 106 05/13/2021       IMPRESSION/RECOMMENDATIONS:      Hyponatremia:  Multifactorial and likely secondary to intravascular volume depletion, along with decreased solute intake. Serum sodium trending up nicely with current management; we will discontinue IV fluids and continue daily free water restriction for now.

## 2021-05-13 NOTE — PLAN OF CARE
Pt remains free from falls during this shift. Pt a/o and calls out appropriately. Bed in lowest position and wheels locked. Call light within reach. Bedside table within reach.     Problem: Falls - Risk of:  Goal: Will remain free from falls  Description: Will remain free from falls  Outcome: Ongoing  Goal: Absence of physical injury  Description: Absence of physical injury  Outcome: Ongoing

## 2021-05-13 NOTE — CARE COORDINATION
Received message from [facility]. [facility] can accept patient skilled at d/c. No cert needed. Covid PCR negative. Felipe Klinefelter, RN      Spoke with Piper Bradford RN today. [facility] patients friend that she lives with is stating he is making decisions for patient and wants her to go home. Daughter was to fax nursing POA paperwork yesterday. ?  Needs psyche eval to determine if is currently decisional. Tanesha to message MD. Felipe Klinefelter, RN

## 2021-05-13 NOTE — CONSULTS
[facility]                                  CONSULTATION    PATIENT NAME: Barbara Bacon                    :        1945  MED REC NO:   [id number]                          ROOM:       5786  ACCOUNT NO:   [de-identified]                           ADMIT DATE: 2021  PROVIDER:     Tony Barclay MD    CONSULT DATE:  2021    REASON FOR CONSULTATION:  Hyponatremia. HISTORY OF PRESENT ILLNESS:  The patient is a 82-year-old   female patient who presented to [facility] with repeated  falls. Upon presentation, she was noted to have hyponatremia with a  serum sodium of 123 mEq/L. The patient has been following a tea and  toast diet and drinks large amounts of free water per day. The patient  was given IV fluid boluses and was placed on fluid restriction with a  notable improvement in her serum sodium trending up to 127 mEq/L. PAST MEDICAL HISTORY:  1. Hypertension. 2.  Repeated falls. PAST SURGICAL HISTORY:  1. Hysterectomy. 2.  Shoulder surgery. ALLERGIES:  The patient has no known drug allergies. SOCIAL HISTORY:  The patient quit smoking a few years ago and drinks  alcohol occasionally. FAMILY HISTORY:  Negative for kidney disease. REVIEW OF SYSTEMS:  The patient denies any fever, chills, cough or  expectorations. Otherwise, a 10-point review of systems was relatively  unremarkable. PHYSICAL EXAMINATION:  VITAL SIGNS:  Blood pressure 120/78, heart rate 67, respirations 16,  temperature 97.8 Fahrenheit. The patient is satting 97% on room air. GENERAL APPEARANCE:  The patient is alert and oriented x3, not in acute  distress. HEENT:  Eyes revealed normal conjunctivae, reactive pupils. NECK:  Revealed midline trachea, nonpalpable thyroid. LUNGS:  Clear to anterior auscultation bilaterally, nonlabored  breathing.   CARDIOVASCULAR:  Exam revealed S1 and S2.

## 2021-05-13 NOTE — PROGRESS NOTES
Pt assessment completed and charted. VSS. Pt alert to person, place, and time. Disoriented to situation at times and forgetful. Purewick in place, pt incontinent. Bed check active for safety. Bed in lowest position and wheels locked. Call light within reach. Bedside table within reach. Non-skid footwear in place. Pt denies any other needs at this time. Pt calls out appropriately. Will continue to monitor.

## 2021-05-13 NOTE — CARE COORDINATION
Order noted: seems there is disagreement between listed POA oldest daughter and friend that pt is living with currently, pt seems upset over this, friend Stan Dumont) stating that he is POA but chart lists daughter as POA, pt stating that daughter does not like Savana Benitez. Psych consult completed. Per note:  \"1). I do not believe she can make informed consent decisions. \"  Spoke with daughter Karis Rogers, updated on [facility] accepting patient for skilled stay at discharge. Also requested copy of [facility] paperwork to establish clear line off decision making as patient cannot. Karis Rogers emailed to this CM. Placed copy on chart and provided to medical records to scan into media. Tanesha MCDOWELL updated. On above and that per psych note. .. \"I think it is likely that her \"roommate\" Savana Benitez may come and try to remove her. If we have documentation that daughter is POA may need to apprise security so that they are aware. \"   Braydon Henderson RN

## 2021-05-14 LAB
ANION GAP SERPL CALCULATED.3IONS-SCNC: 10 MMOL/L (ref 3–16)
ANION GAP SERPL CALCULATED.3IONS-SCNC: 10 MMOL/L (ref 3–16)
BUN BLDV-MCNC: 6 MG/DL (ref 7–20)
BUN BLDV-MCNC: <2 MG/DL (ref 7–20)
CALCIUM SERPL-MCNC: 9.1 MG/DL (ref 8.3–10.6)
CALCIUM SERPL-MCNC: 9.6 MG/DL (ref 8.3–10.6)
CHLORIDE BLD-SCNC: 94 MMOL/L (ref 99–110)
CHLORIDE BLD-SCNC: 94 MMOL/L (ref 99–110)
CO2: 21 MMOL/L (ref 21–32)
CO2: 23 MMOL/L (ref 21–32)
CREAT SERPL-MCNC: <0.5 MG/DL (ref 0.6–1.2)
CREAT SERPL-MCNC: <0.5 MG/DL (ref 0.6–1.2)
GFR AFRICAN AMERICAN: >60
GFR AFRICAN AMERICAN: >60
GFR NON-AFRICAN AMERICAN: >60
GFR NON-AFRICAN AMERICAN: >60
GLUCOSE BLD-MCNC: 104 MG/DL (ref 70–99)
GLUCOSE BLD-MCNC: 84 MG/DL (ref 70–99)
MAGNESIUM: 2 MG/DL (ref 1.8–2.4)
POTASSIUM SERPL-SCNC: 4.3 MMOL/L (ref 3.5–5.1)
POTASSIUM SERPL-SCNC: 4.7 MMOL/L (ref 3.5–5.1)
SODIUM BLD-SCNC: 125 MMOL/L (ref 136–145)
SODIUM BLD-SCNC: 127 MMOL/L (ref 136–145)

## 2021-05-14 PROCEDURE — 97110 THERAPEUTIC EXERCISES: CPT

## 2021-05-14 PROCEDURE — 96372 THER/PROPH/DIAG INJ SC/IM: CPT

## 2021-05-14 PROCEDURE — G0378 HOSPITAL OBSERVATION PER HR: HCPCS

## 2021-05-14 PROCEDURE — 36415 COLL VENOUS BLD VENIPUNCTURE: CPT

## 2021-05-14 PROCEDURE — 97530 THERAPEUTIC ACTIVITIES: CPT

## 2021-05-14 PROCEDURE — 80048 BASIC METABOLIC PNL TOTAL CA: CPT

## 2021-05-14 PROCEDURE — 6360000002 HC RX W HCPCS: Performed by: INTERNAL MEDICINE

## 2021-05-14 PROCEDURE — 83735 ASSAY OF MAGNESIUM: CPT

## 2021-05-14 PROCEDURE — 6370000000 HC RX 637 (ALT 250 FOR IP): Performed by: INTERNAL MEDICINE

## 2021-05-14 PROCEDURE — 1200000000 HC SEMI PRIVATE

## 2021-05-14 PROCEDURE — 97116 GAIT TRAINING THERAPY: CPT

## 2021-05-14 RX ORDER — TOLVAPTAN 15 MG/1
7.5 TABLET ORAL ONCE
Status: COMPLETED | OUTPATIENT
Start: 2021-05-14 | End: 2021-05-14

## 2021-05-14 RX ORDER — TOLVAPTAN 15 MG/1
7.5 TABLET ORAL DAILY
Status: DISCONTINUED | OUTPATIENT
Start: 2021-05-14 | End: 2021-05-14 | Stop reason: CLARIF

## 2021-05-14 RX ADMIN — Medication 7.5 MG: at 15:23

## 2021-05-14 RX ADMIN — ENOXAPARIN SODIUM 40 MG: 40 INJECTION SUBCUTANEOUS at 10:15

## 2021-05-14 RX ADMIN — Medication 3 MG: at 23:25

## 2021-05-14 RX ADMIN — AMLODIPINE BESYLATE 5 MG: 5 TABLET ORAL at 10:15

## 2021-05-14 RX ADMIN — CALCIUM 500 MG: 500 TABLET ORAL at 10:15

## 2021-05-14 RX ADMIN — ASPIRIN 81 MG: 81 TABLET, CHEWABLE ORAL at 10:15

## 2021-05-14 RX ADMIN — DONEPEZIL HYDROCHLORIDE 10 MG: 5 TABLET, FILM COATED ORAL at 20:21

## 2021-05-14 ASSESSMENT — PAIN SCALES - GENERAL
PAINLEVEL_OUTOF10: 0

## 2021-05-14 NOTE — PLAN OF CARE
Pt denies pain at this time. Pt is currently resting comfortably. Will continue to monitor.    Problem: Pain:  Goal: Pain level will decrease  Description: Pain level will decrease  Outcome: Ongoing

## 2021-05-14 NOTE — PROGRESS NOTES
Hospitalist Progress Note      PCP: Tanmay Hussein MD    Date of Admission: 5/11/2021    Chief Complaint: Weakness, falls    Hospital Course: Admitted with progressive weakness and multiple falls. Noted a small asymptomatic T12 compression fracture. Hyponatremia seems to be a significant finding. Patient evaluated by nephrology. Sodium level is better today. Noted decreased oral intake recently. Patient was agreeable to go to skilled nursing facility yesterday. Seems to have changed her mind today. I am concerned about patient's ability to make own decisions. Subjective: No chest pain, no shortness of breath, no nausea or vomiting. No abdominal pain. No new complaints. No back pain. Medications:  Reviewed    Infusion Medications    sodium chloride       Scheduled Medications    enoxaparin  40 mg Subcutaneous Daily    amLODIPine  5 mg Oral Daily    aspirin  81 mg Oral Daily    calcium elemental  500 mg Oral Daily    donepezil  10 mg Oral Nightly     PRN Meds: sodium chloride flush, sodium chloride, promethazine **OR** ondansetron, acetaminophen **OR** acetaminophen, melatonin      Intake/Output Summary (Last 24 hours) at 5/14/2021 1126  Last data filed at 5/14/2021 0820  Gross per 24 hour   Intake 480 ml   Output 1850 ml   Net -1370 ml       Physical Exam Performed:    /80   Pulse 84   Temp 98.1 °F (36.7 °C) (Oral)   Resp 18   Ht 4' 6\" (1.372 m)   Wt 115 lb 12.8 oz (52.5 kg)   SpO2 99%   BMI 27.92 kg/m²     General appearance: No apparent distress, appears stated age. Verbally uncooperative today with no behavioral issues otherwise. HEENT: Pupils equal, round, and reactive to light. Conjunctivae/corneas clear. Neck: Supple, with full range of motion. No jugular venous distention. Trachea midline. Respiratory:  Normal respiratory effort. Clear to auscultation, bilaterally without Rales/Wheezes/Rhonchi.   Cardiovascular: Regular rate and rhythm with normal S1/S2 without CT CERVICAL SPINE WO CONTRAST   Final Result   No acute osseous abnormality      Multilevel degenerative disc disease and facet arthropathy throughout. CT LUMBAR SPINE WO CONTRAST   Final Result   Age indeterminate wedging superior endplate U80 left. Correlate with point   tenderness. Scattered levels of degenerative disc disease. 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 Assessment/Plan:    Active Hospital Problems    Diagnosis    Weakness [R53.1]    Generalized weakness [R53.1]    Dementia (HCC) [F03.90]    Frequent falls [R29.6]    DDD (degenerative disc disease), lumbar [M51.36]    DDD (degenerative disc disease), cervical [M50.30]    Hyponatremia [E87.1]     PLAN:    Generalized weakness  Hyponatremia might explain. Patient still feels weak. PT OT recommended skilled nursing facility. Patient was initially not agreeable, but now she agrees to go to a skilled nursing facility for rehab. Noted patient not capable of making own decisions according to psychiatry expert opinion. Hyponatremia  Probably secondary to poor oral intake, including poor solute intake. 123 on arrival.  Went up to 131, down to 125 today. Nephrology input appreciated  Continue IV hydration    T12 compression fracture  Asymptomatic. Treatment does not seem necessary except for PT OT. Hypertension  Holding lisinopril due to patient's blood pressure being on the low side. Continue amlodipine. Dementia  Continue donepezil  Patient is more agreeable today. Does not have capacity to make decisions. Seen by psychiatry. Discussed with the patient. Discussed with case management. DVT Prophylaxis: Lovenox  Diet: DIET GENERAL; Daily Fluid Restriction: 1000 ml  Code Status: Full Code    PT/OT Eval Status: In process    Dispo -disposition is skilled nursing facility.   However, is not going to be discharged today due to recurrence of hyponatremia. Down to 125 today.     Sayra Cuellar MD

## 2021-05-14 NOTE — CONSULTS
[address] [place] [address]                 Fernando Arianna                                   CONSULTATION    PATIENT NAME: Mague Morton                    :        1945  MED REC NO:   [id number]                          ROOM:       2211  ACCOUNT NO:   [de-identified]                           ADMIT DATE: 2021  PROVIDER:     Clayton Phoenix, MD    CONSULT DATE:  2021    HISTORY OF PRESENT ILLNESS:  The patient is a 65-year-old female who she  reports, referred herself to the hospital because she had been having  back pain and struggling with her gait and had several falls recently. She has a hired caregiver that stays with her two days a week named  Phoebe Umanzor and she was present for the evaluation. I also spoke to Karis Rogers,  the patient's daughter who is vacationing in [place]. The patient  states she was out with Phoebe Umanzor, was having a lot of pain and had these  falls and came to the hospital hoping to get a shot or something to deal  with the pain and came into the emergency room where she was ultimately  admitted. She also carries a diagnosis of dementia and Karis Rogers reports  that she had had her mother placed in an assisted living facility about  a year ago, but she only stayed a brief time and the patient's room mate  (who was her ex-boyfriend) ended up taking her out of that facility. There is tension between the patient's daughters and Savana Benitez and they do not  always see eye-to-eye or agree on what is in the best interest of the  patient. Psychiatry was asked to evaluate whether the patient could  make informed consent decisions. There had been discussion about the  patient going to a skilled nursing facility and at different times, the  patient will consent to that but then she also notes that her roommate  Savana Benitez is not okay with that idea and so she vacillates between agreeing to  the plan or not.     PAST PSYCHIATRIC HISTORY:  The patient does not have any previous  history other than the dementia diagnosis. She does not have any  history of suicide attempts. She is only taking Aricept. She has not  been on antidepressants. She does not have any history of inpatient  psychiatric hospitalizations. She does not have any history of ECT. FAMILY PSYCHIATRIC HISTORY:  Negative for completed suicides. SOCIAL HISTORY:  The patient is living with Andres Garcia and in  [facility]. The  patient and he had been in a relationship a number of years ago, but  with changes in her cognitive status, they are more or less roommates  now, but the daughter describes Andres Garcia as controlling and works sometimes  61 or more hours per week and so the patient is at home alone for a  large part of the time which is why she hired Lisa Conde two days a week to  come in and spend time and create some socialization. The patient does  not have any history of alcohol or drug dependency issues. There is no  report of violence in her relationship with Andres Garcia. She states that she  was  from her  years ago and he subsequently , but  she has 6 children and she has a good relationship with her children. She denies any current financial problems and did not disclose any  history of trauma. REVIEW OF SYSTEMS:  A 10-system review was done and the patient admitted  to feeling lightheaded sometimes, having falls, having chronic back pain  which is nonradiating and centered on the lower part of her back, and  also admitted to some vague memory loss, but otherwise her review of  systems was negative. PHYSICAL EXAMINATION:  VITAL SIGNS:  Temperature was 98.6, her pulse was 80, respirations 16,  blood pressure was 126/85. GENERAL APPEARANCE:  The patient appears as a female who looks her  stated age, but she is very cooperative and in no acute distress.   NEUROMUSCULAR EXAM:  The patient appeared to have some generalized  weakness in terms of her strength, but her muscle tone appeared to be  normal throughout. Her gait, the patient was not ambulated today and  because of her complaints of low back pain and there was no walker  available and she typically uses a walker. MENTAL STATUS EXAM:  The patient appears as a female who looks her  stated age. She is cooperative, maintains good eye contact. There is  no abnormal movements, tics or mannerisms that would suggest tardive  dyskinesia. Her thought processes are concrete, but they did not appear  to be dominated by hallucinations or delusions or paranoia. She does  not report any of those things. From a mood perspective, she is anxious  at how Andres Garcia is going to deal with the decision to go to a skilled nursing  facility, but she denies signs and symptoms consistent with major  depression. Her affect is appropriate to the situation. She denies  suicidal or homicidal ideation, intent or plan. She is alert and  oriented x4. Her speech is spontaneous and goal directed. Her language  is without any kind of aphasia. On memory test, she was 3/3 objects  immediately with only 1/3 objects after about 5 to 10 minutes. She was  not able to name the president in fact when I told that it president  Bri Forbes, she actually could not believe that that was true. When I asked  who the president was before Bri Forbes, she said \"Zack. \"  The patient was  unable to tell me the names of any of her medications or what they were  treating and could not identify any other medical problems the staff  here had identified or what the treatment plan was surrounding those. The patient's answer to judgment questions was fair but even when  presented with new information, the patient struggled to incorporate  that into her thinking and ends up defaulting back to her previous  thoughts. Her attention span is adequate. Her general fund of  knowledge is average. Her insight and judgment limited. DIAGNOSES:  AXIS I:  Vascular dementia without behavioral disturbances. AXIS II:  No diagnosis. AXIS III:  1. Hypertension. 2.  Chronic back pain. AXIS IV:  Severe. AXIS V:  Current GAF 45 to 50. Highest in the past year 39 to 48. RECOMMENDATIONS:  1. The patient at this time, I believe is not able to make informed  consent decisions. While she is able to answer some questions and is  fully oriented, the fact that she cannot name any of her medications or  any of the medical conditions that she is treating or what the treatment  team here has found and what they are recommending suggest that she is  really not able to weigh the relative risks and benefits of any part of  the treatment plan. She seems generally happy to do what other people  are telling her to do, but when that becomes conflictual as it is  between her daughter, Tulio De Jesus and her roommate Lc Gibbs, the patient has no  ability to weigh these various options, come up with the pros and cons  and make any kind of decision. I am told that her daughter, Tulio De Jesus is  her health care power of  and we will need to see the  documentation of that, but as of today's date, I do not believe the  patient can make any decisions regarding her on treatment, her placement  or anything in the healthcare nature. The other concern I have is that  the patient's roommate Lc Gibbs at least by report is someone who is likely  to come up and simply demand to take the patient home. I have apprised  the staff of my concerns about that and that may be prudent to warn  security that this scenario may unfold and be prepared to deal with  that. 2.  More than 70 minutes was spent on this consultation, more than half  of which was spent directly with the patient and in treatment planning  and care coordination.         Oscar Sierra MD    D: 05/13/2021 23:15:37       T: 05/13/2021 23:23:50     [email or web address]  Job#: [id number]     Doc#: [id number]    CC:

## 2021-05-14 NOTE — PROGRESS NOTES
Department of Internal Medicine  Nephrology Progress Note        SUBJECTIVE:    We are following this patient for Hyponatremia. The patient was seen and examined; she feels well today with no CP, SOB, nausea or vomiting. ROS: No fever or chills. Social: No family at bedside. Physical Exam:    VITALS:  /80   Pulse 84   Temp 98.1 °F (36.7 °C) (Oral)   Resp 18   Ht 4' 6\" (1.372 m)   Wt 115 lb 12.8 oz (52.5 kg)   SpO2 99%   BMI 27.92 kg/m²     General appearance: Seems comfortable, no acute distress. Neck: Trachea midline, thyroid normal.   Lungs:  Non labored breathing, CTA to anterior auscultation. Heart:  S1S2 normal, rub or gallop. No peripheral edema. Abdomen: Soft, non-tender, no organomegaly. Skin: No lesions or rashes, warm to touch. DATA:    CBC:   Lab Results   Component Value Date    WBC 4.5 05/13/2021    RBC 3.98 05/13/2021    HGB 12.2 05/13/2021    HCT 35.6 05/13/2021    MCV 89.5 05/13/2021    MCH 30.5 05/13/2021    MCHC 34.1 05/13/2021    RDW 12.3 05/13/2021     05/13/2021    MPV 7.2 05/13/2021     BMP:    Lab Results   Component Value Date     05/14/2021    K 4.3 05/14/2021    CL 94 05/14/2021    CO2 21 05/14/2021    BUN <2 05/14/2021    LABALBU 4.0 05/11/2021    CREATININE <0.5 05/14/2021    CALCIUM 9.1 05/14/2021    GFRAA >60 05/14/2021    LABGLOM >60 05/14/2021    GLUCOSE 84 05/14/2021       IMPRESSION/RECOMMENDATIONS:      Hyponatremia:  Multifactorial and likely secondary to intravascular volume depletion, along with decreased solute intake. Serum sodium trending back down despite daily free water restriction; we will administer a single dose of Tolvaptan today and monitor serial labs.

## 2021-05-14 NOTE — PROGRESS NOTES
Pt assessment completed and charted. VSS. Pt a/o with confusion. Purewick in place for overnight only. Bed check active for safety. Pt encouraged to turn. Bed in lowest position and wheels locked. Call light within reach. Bedside table within reach. Non-skid footwear in place. Pt denies any other needs at this time. Pt calls out appropriately. Will continue to monitor.

## 2021-05-14 NOTE — PROGRESS NOTES
Physical Therapy  Facility/Department: [facility] C3 TELE/MED SURG/ONC  Daily Treatment Note  NAME: Allen Ding  : 1945  MRN: [id number]    Date of Service: 2021    Discharge Recommendations:  Subacute/Skilled Nursing Facility   PT Equipment Recommendations  Equipment Needed: No  Other: Defer to facility. Pt owns RW. Assessment   Body structures, Functions, Activity limitations: Decreased functional mobility ; Decreased balance;Decreased safe awareness;Decreased cognition  Assessment: PT tx session focused on functional transfers (min/mod A) and household distance ambulation with RW w/ min /cga. Pt performed multiple sit<>stand transfers from various surfaces. Educated pt regarding benefits of continued skilled PT at SNF setting to increase strength, reduce falls and maximize independence. Continue to recommend snf. Will continue to follow. Treatment Diagnosis: impaired mobility  Prognosis: Good  Decision Making: Medium Complexity  Patient Education: pt verbalized understanding of importance of OOB activity, needs reinforcement  Barriers to Learning: cognition, easliy distractible  REQUIRES PT FOLLOW UP: Yes  Activity Tolerance  Activity Tolerance: Patient limited by cognitive status; Patient Tolerated treatment well;Patient limited by fatigue  Activity Tolerance: /80  hr 89  O2 96% RA     Patient Diagnosis(es): The primary encounter diagnosis was Frequent falls. Diagnoses of Compression fracture of T12 vertebra, initial encounter ([facility] [address] and Hyponatremia were also pertinent to this visit. has a past medical history of Falls and Hypertension. has a past surgical history that includes shoulder surgery; knee surgery; and Hysterectomy.     Restrictions  Restrictions/Precautions  Restrictions/Precautions: General Precautions, Fall Risk  Position Activity Restriction  Other position/activity restrictions: up in chair, purewick, peripheral IV  Subjective   General  Chart Reviewed: Yes  Response To Previous Treatment: Patient with no complaints from previous session. Family / Caregiver Present: No  Referring Practitioner: Lestine Duverney, MD  Subjective  Subjective: Pt seated in chair upon arrival, agreeable to PT tx session. General Comment  Comments: RN cleared for therapy  Pain Screening  Patient Currently in Pain: Denies  Vital Signs  Patient Currently in Pain: Denies       Orientation  Orientation  Overall Orientation Status: Within Functional Limits  Cognition      Objective   Bed mobility  Supine to Sit: Unable to assess  Sit to Supine: Unable to assess  Transfers  Sit to Stand: Minimal Assistance; Moderate Assistance  Stand to sit: Minimal Assistance  Ambulation  Ambulation?: Yes  Ambulation 1  Surface: level tile  Device: Rolling Walker  Assistance: Minimal assistance  Quality of Gait: cues for upright posture. No overt LOB or buckling noted during ambulation. Mildly impaired safety awareness, required cues to stay closer to rw, for safe turns and for turn to sit. .  Gait Deviations: Shuffles;Decreased step length;Decreased step height  Distance: 60'  25'     Balance  Posture: Fair  Sitting - Static: Good  Sitting - Dynamic: Good;-  Standing - Static: Fair;+  Standing - Dynamic: Fair  Exercises  Gluteal Sets: x 10 B  Hip Flexion: x 10 B  Hip Abduction: x 10 B clamshells  Knee Long Arc Quad: x 10 B  Ankle Pumps: x 20 B         Comment: Pt education on safety with transfers. Practiced sit<> stand form chair3 times consecutively and from bed 2xs.             AM-PAC Score  AM-PAC Inpatient Mobility Raw Score : 16 (05/14/21 1454)  AM-PAC Inpatient T-Scale Score : 40.78 (05/14/21 1454)  Mobility Inpatient CMS 0-100% Score: 54.16 (05/14/21 1454)  Mobility Inpatient CMS G-Code Modifier : CK (05/14/21 1454)          Goals  Short term goals  Time Frame for Short term goals: 5/18/21 unless noted  Short term goal 1: PT will perform bed mobility with SBA by 5/17/21 - DNT 5/13  -5/14 NT  Short term goal 2: Pt will perform transfers with SBA - 5/13: CGA-min A    -5/14 min/mod A x 1  Short term goal 3: Pt will ambulate 90 ft with RW and CGA - 5/13: ongoing    -5/14 60' rw min A  Patient Goals   Patient goals : \"to go home today\"    Plan    Plan  Times per week: 3-5  Current Treatment Recommendations: Strengthening, Home Exercise Program, ROM, Balance Training, Endurance Training, Functional Mobility Training, Transfer Training, Gait Training  Safety Devices  Type of devices:  All fall risk precautions in place, Call light within reach, Chair alarm in place, Gait belt, Nurse notified, Left in chair, Patient at risk for falls  Restraints  Initially in place: No     Therapy Time   Individual Concurrent Group Co-treatment   Time In 1358         Time Out 1436         Minutes 38         Timed Code Treatment Minutes: [facility]

## 2021-05-15 LAB
ANION GAP SERPL CALCULATED.3IONS-SCNC: 6 MMOL/L (ref 3–16)
ANION GAP SERPL CALCULATED.3IONS-SCNC: 7 MMOL/L (ref 3–16)
ANION GAP SERPL CALCULATED.3IONS-SCNC: 9 MMOL/L (ref 3–16)
ANION GAP SERPL CALCULATED.3IONS-SCNC: 9 MMOL/L (ref 3–16)
BUN BLDV-MCNC: 4 MG/DL (ref 7–20)
BUN BLDV-MCNC: 5 MG/DL (ref 7–20)
BUN BLDV-MCNC: 7 MG/DL (ref 7–20)
BUN BLDV-MCNC: 8 MG/DL (ref 7–20)
CALCIUM SERPL-MCNC: 9.4 MG/DL (ref 8.3–10.6)
CALCIUM SERPL-MCNC: 9.8 MG/DL (ref 8.3–10.6)
CHLORIDE BLD-SCNC: 94 MMOL/L (ref 99–110)
CHLORIDE BLD-SCNC: 94 MMOL/L (ref 99–110)
CHLORIDE BLD-SCNC: 95 MMOL/L (ref 99–110)
CHLORIDE BLD-SCNC: 95 MMOL/L (ref 99–110)
CO2: 23 MMOL/L (ref 21–32)
CO2: 25 MMOL/L (ref 21–32)
CO2: 26 MMOL/L (ref 21–32)
CO2: 28 MMOL/L (ref 21–32)
CREAT SERPL-MCNC: 0.6 MG/DL (ref 0.6–1.2)
CREAT SERPL-MCNC: <0.5 MG/DL (ref 0.6–1.2)
GFR AFRICAN AMERICAN: >60
GFR NON-AFRICAN AMERICAN: >60
GLUCOSE BLD-MCNC: 103 MG/DL (ref 70–99)
GLUCOSE BLD-MCNC: 139 MG/DL (ref 70–99)
GLUCOSE BLD-MCNC: 98 MG/DL (ref 70–99)
GLUCOSE BLD-MCNC: 99 MG/DL (ref 70–99)
MAGNESIUM: 2.1 MG/DL (ref 1.8–2.4)
POTASSIUM SERPL-SCNC: 4.1 MMOL/L (ref 3.5–5.1)
POTASSIUM SERPL-SCNC: 4.1 MMOL/L (ref 3.5–5.1)
POTASSIUM SERPL-SCNC: 4.2 MMOL/L (ref 3.5–5.1)
POTASSIUM SERPL-SCNC: 5 MMOL/L (ref 3.5–5.1)
SODIUM BLD-SCNC: 127 MMOL/L (ref 136–145)
SODIUM BLD-SCNC: 127 MMOL/L (ref 136–145)
SODIUM BLD-SCNC: 128 MMOL/L (ref 136–145)
SODIUM BLD-SCNC: 129 MMOL/L (ref 136–145)

## 2021-05-15 PROCEDURE — 6370000000 HC RX 637 (ALT 250 FOR IP): Performed by: INTERNAL MEDICINE

## 2021-05-15 PROCEDURE — 83735 ASSAY OF MAGNESIUM: CPT

## 2021-05-15 PROCEDURE — 1200000000 HC SEMI PRIVATE

## 2021-05-15 PROCEDURE — 36415 COLL VENOUS BLD VENIPUNCTURE: CPT

## 2021-05-15 PROCEDURE — 6360000002 HC RX W HCPCS: Performed by: INTERNAL MEDICINE

## 2021-05-15 PROCEDURE — 80048 BASIC METABOLIC PNL TOTAL CA: CPT

## 2021-05-15 PROCEDURE — 2580000003 HC RX 258: Performed by: INTERNAL MEDICINE

## 2021-05-15 RX ORDER — TOLVAPTAN 15 MG/1
7.5 TABLET ORAL ONCE
Status: COMPLETED | OUTPATIENT
Start: 2021-05-15 | End: 2021-05-15

## 2021-05-15 RX ADMIN — CALCIUM 500 MG: 500 TABLET ORAL at 08:42

## 2021-05-15 RX ADMIN — Medication 7.5 MG: at 13:55

## 2021-05-15 RX ADMIN — ACETAMINOPHEN 650 MG: 325 TABLET ORAL at 06:22

## 2021-05-15 RX ADMIN — DONEPEZIL HYDROCHLORIDE 10 MG: 5 TABLET, FILM COATED ORAL at 19:35

## 2021-05-15 RX ADMIN — ACETAMINOPHEN 650 MG: 325 TABLET ORAL at 20:54

## 2021-05-15 RX ADMIN — ASPIRIN 81 MG: 81 TABLET, CHEWABLE ORAL at 08:41

## 2021-05-15 RX ADMIN — Medication 10 ML: at 19:35

## 2021-05-15 RX ADMIN — AMLODIPINE BESYLATE 5 MG: 5 TABLET ORAL at 08:42

## 2021-05-15 RX ADMIN — ENOXAPARIN SODIUM 40 MG: 40 INJECTION SUBCUTANEOUS at 08:40

## 2021-05-15 ASSESSMENT — PAIN SCALES - GENERAL: PAINLEVEL_OUTOF10: 9

## 2021-05-15 NOTE — PLAN OF CARE
Problem: Falls - Risk of:  Goal: Will remain free from falls  Description: Will remain free from falls  5/14/2021 2000 by Urmila Izquierdo RN  Outcome: Ongoing  5/14/2021 2000 by Urmila Izquierdo RN  Outcome: Ongoing   Pt free from falls and injury. Call light within reach. Urmila Izquierdo

## 2021-05-15 NOTE — PLAN OF CARE
Pt is a fall risk. Bed in lowest position with wheels locked and side rails x2. Non slip socks and bed alarm on. Call light and bedside table within reach, will continue to monitor.

## 2021-05-15 NOTE — PROGRESS NOTES
Assessment complete and charted earlier in shift. Pt states that she does not want to go to the [facility] and would instead rather go to ARU within this hospital. Will communicate to AM RN to discuss with CM and daughter. No change in pt status. Call light within reach, will continue to monitor.

## 2021-05-15 NOTE — PROGRESS NOTES
Hospitalist Progress Note      PCP: Pema Saenz MD    Date of Admission: 5/11/2021    Chief Complaint: Weakness, falls    Hospital Course: Admitted with progressive weakness and multiple falls. Noted a small asymptomatic T12 compression fracture. Hyponatremia seems to be a significant finding. Patient evaluated by nephrology. Sodium level is better today. Noted decreased oral intake recently. Patient was agreeable to go to skilled nursing facility yesterday. Seems to have changed her mind today. I am concerned about patient's ability to make own decisions. Subjective: No chest pain, no shortness of breath, no nausea or vomiting. No abdominal pain. No new complaints. No back pain. Medications:  Reviewed    Infusion Medications    sodium chloride       Scheduled Medications    Lip Balm        enoxaparin  40 mg Subcutaneous Daily    amLODIPine  5 mg Oral Daily    aspirin  81 mg Oral Daily    calcium elemental  500 mg Oral Daily    donepezil  10 mg Oral Nightly     PRN Meds: sodium chloride flush, sodium chloride, promethazine **OR** ondansetron, acetaminophen **OR** acetaminophen, melatonin      Intake/Output Summary (Last 24 hours) at 5/15/2021 1726  Last data filed at 5/14/2021 2016  Gross per 24 hour   Intake 240 ml   Output 600 ml   Net -360 ml       Physical Exam Performed:    /65   Pulse 83   Temp 98.7 °F (37.1 °C) (Oral)   Resp 16   Ht 4' 6\" (1.372 m)   Wt 115 lb 12.8 oz (52.5 kg)   SpO2 97%   BMI 27.92 kg/m²     General appearance: No apparent distress, appears stated age. Verbally uncooperative today with no behavioral issues otherwise. HEENT: Pupils equal, round, and reactive to light. Conjunctivae/corneas clear. Neck: Supple, with full range of motion. No jugular venous distention. Trachea midline. Respiratory:  Normal respiratory effort. Clear to auscultation, bilaterally without Rales/Wheezes/Rhonchi.   Cardiovascular: Regular rate and rhythm with normal S1/S2 without murmurs, rubs or gallops. Abdomen: Soft, non-tender, non-distended with normal bowel sounds. Musculoskeletal: No clubbing, cyanosis or edema bilaterally. Full range of motion without deformity. Skin: Skin color, texture, turgor normal.  No rashes or lesions. Neurologic:  Neurovascularly intact without any focal sensory/motor deficits. Cranial nerves: II-XII intact, grossly non-focal.  Psychiatric: Alert and oriented. Thought content and insight appear superficial and limited. Similar to yesterday. Capillary Refill: Brisk, 3 seconds, normal   Peripheral Pulses: +2 palpable, equal bilaterally     Examined the patient today (05/15/21). Physical exam is similar to yesterday (5/12). Labs:   Recent Labs     05/13/21  0640   WBC 4.5   HGB 12.2   HCT 35.6*        Recent Labs     05/15/21  0027 05/15/21  0617 05/15/21  1355   * 127* 127*   K 4.1 4.1 4.2   CL 94* 94* 95*   CO2 25 26 23   BUN 5* 4* 8   CREATININE <0.5* <0.5* <0.5*   CALCIUM 9.4 9.4 9.8     No results for input(s): AST, ALT, BILIDIR, BILITOT, ALKPHOS in the last 72 hours. No results for input(s): INR in the last 72 hours. No results for input(s): [place] in the last 72 hours. Urinalysis:      Lab Results   Component Value Date    NITRU Negative 05/11/2021    WBCUA None seen 10/29/2019    RBCUA None seen 10/29/2019    BLOODU Negative 05/11/2021    SPECGRAV 1.015 05/11/2021    GLUCOSEU Negative 05/11/2021       Radiology:  XR ANKLE LEFT (MIN 3 VIEWS)   Final Result   No acute osseous abnormality evident. Soft tissue edema, presumably reactive edema, contusion and/or sprain. Follow up imaging is recommended if pain persists or worsens.          XR CHEST PORTABLE   Final Result   No acute cardiopulmonary process         CT HEAD WO CONTRAST   Final Result   No acute traumatic intracranial abnormality      Atrophy and small-vessel ischemic change      Chronic appearing left sphenoid sinus disease         CT CERVICAL SPINE WO CONTRAST   Final Result   No acute osseous abnormality      Multilevel degenerative disc disease and facet arthropathy throughout. CT LUMBAR SPINE WO CONTRAST   Final Result   Age indeterminate wedging superior endplate T53 left. Correlate with point   tenderness. Scattered levels of degenerative disc disease. 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 Assessment/Plan:    Active Hospital Problems    Diagnosis     Weakness [R53.1]     Generalized weakness [R53.1]     Dementia (HCC) [F03.90]     Frequent falls [R29.6]     DDD (degenerative disc disease), lumbar [M51.36]     DDD (degenerative disc disease), cervical [M50.30]     Hyponatremia [E87.1]      PLAN:    Generalized weakness  Hyponatremia might explain. Slow improvement in weakness accompanies improvement in hyponatremia. However, patient still feels weak. PT OT recommended skilled nursing facility. Patient was initially not agreeable, but now she agrees to go to a skilled nursing facility for rehab. Noted patient not capable of making own decisions according to psychiatry expert opinion. Hyponatremia  Probably secondary to poor oral intake, including poor solute intake. 123 on arrival.  Currently fluctuating. After a peak of 131, today is 127. Required [place]. Nephrology input appreciated    T12 compression fracture  Asymptomatic. Treatment does not seem necessary except for PT OT. Does not complain of pain. Hypertension  Holding lisinopril due to patient's blood pressure being on the low side. Continue amlodipine at 5 mg dose. Dementia  Continue donepezil  Patient is more cooperative. Does not have capacity to make decisions. Seen by psychiatry. Discussed with nursing    DVT Prophylaxis: Lovenox  Diet: DIET GENERAL;  Code Status: Full Code    PT/OT Eval Status:  In process    Dispo -disposition is planned for

## 2021-05-15 NOTE — CARE COORDINATION
CM met with pt at bedside with daughter Danika Steiner on the phone. Both in agreement for [facility].  SARAN notes Neph note from today, \"Serum sodium slowly trending up; we will administer another dose of Tolvaptan today and monitor serial labs\" CM following-Aby Almanzar RN

## 2021-05-15 NOTE — PROGRESS NOTES
Department of Internal Medicine  Nephrology Progress Note        SUBJECTIVE:    We are following this patient for Hyponatremia. The patient was seen and examined; she feels well today with no CP, SOB, nausea or vomiting. ROS: No fever or chills. Social: Family at bedside. Physical Exam:    VITALS:  /73   Pulse 71   Temp 97.7 °F (36.5 °C) (Axillary)   Resp 16   Ht 4' 6\" (1.372 m)   Wt 115 lb 12.8 oz (52.5 kg)   SpO2 97%   BMI 27.92 kg/m²     General appearance: Seems comfortable, no acute distress. Neck: Trachea midline, thyroid normal.   Lungs:  Non labored breathing, CTA to anterior auscultation. Heart:  S1S2 normal, rub or gallop. No peripheral edema. Abdomen: Soft, non-tender, no organomegaly. Skin: No lesions or rashes, warm to touch. DATA:    CBC:   Lab Results   Component Value Date    WBC 4.5 05/13/2021    RBC 3.98 05/13/2021    HGB 12.2 05/13/2021    HCT 35.6 05/13/2021    MCV 89.5 05/13/2021    MCH 30.5 05/13/2021    MCHC 34.1 05/13/2021    RDW 12.3 05/13/2021     05/13/2021    MPV 7.2 05/13/2021     BMP:    Lab Results   Component Value Date     05/15/2021    K 4.1 05/15/2021    CL 94 05/15/2021    CO2 26 05/15/2021    BUN 4 05/15/2021    LABALBU 4.0 05/11/2021    CREATININE <0.5 05/15/2021    CALCIUM 9.4 05/15/2021    GFRAA >60 05/15/2021    LABGLOM >60 05/15/2021    GLUCOSE 99 05/15/2021       IMPRESSION/RECOMMENDATIONS:      Hyponatremia:  Multifactorial and likely secondary to intravascular volume depletion, along with decreased solute intake. Serum sodium slowly trending up; we will administer another dose of Tolvaptan today and monitor serial labs.

## 2021-05-16 LAB
ANION GAP SERPL CALCULATED.3IONS-SCNC: 6 MMOL/L (ref 3–16)
BUN BLDV-MCNC: 6 MG/DL (ref 7–20)
CALCIUM SERPL-MCNC: 9.6 MG/DL (ref 8.3–10.6)
CHLORIDE BLD-SCNC: 98 MMOL/L (ref 99–110)
CO2: 28 MMOL/L (ref 21–32)
CREAT SERPL-MCNC: <0.5 MG/DL (ref 0.6–1.2)
GFR AFRICAN AMERICAN: >60
GFR NON-AFRICAN AMERICAN: >60
GLUCOSE BLD-MCNC: 94 MG/DL (ref 70–99)
HCT VFR BLD CALC: 36.8 % (ref 36–48)
HEMOGLOBIN: 12.7 G/DL (ref 12–16)
MAGNESIUM: 2.1 MG/DL (ref 1.8–2.4)
MCH RBC QN AUTO: 31 PG (ref 26–34)
MCHC RBC AUTO-ENTMCNC: 34.4 G/DL (ref 31–36)
MCV RBC AUTO: 90 FL (ref 80–100)
PDW BLD-RTO: 12.5 % (ref 12.4–15.4)
PLATELET # BLD: 261 K/UL (ref 135–450)
PMV BLD AUTO: 7.9 FL (ref 5–10.5)
POTASSIUM SERPL-SCNC: 4.3 MMOL/L (ref 3.5–5.1)
RBC # BLD: 4.09 M/UL (ref 4–5.2)
SODIUM BLD-SCNC: 132 MMOL/L (ref 136–145)
WBC # BLD: 4.6 K/UL (ref 4–11)

## 2021-05-16 PROCEDURE — 83735 ASSAY OF MAGNESIUM: CPT

## 2021-05-16 PROCEDURE — 80048 BASIC METABOLIC PNL TOTAL CA: CPT

## 2021-05-16 PROCEDURE — 2580000003 HC RX 258: Performed by: INTERNAL MEDICINE

## 2021-05-16 PROCEDURE — 6360000002 HC RX W HCPCS: Performed by: INTERNAL MEDICINE

## 2021-05-16 PROCEDURE — 1200000000 HC SEMI PRIVATE

## 2021-05-16 PROCEDURE — 36415 COLL VENOUS BLD VENIPUNCTURE: CPT

## 2021-05-16 PROCEDURE — 6370000000 HC RX 637 (ALT 250 FOR IP): Performed by: INTERNAL MEDICINE

## 2021-05-16 PROCEDURE — 85027 COMPLETE CBC AUTOMATED: CPT

## 2021-05-16 RX ADMIN — ENOXAPARIN SODIUM 40 MG: 40 INJECTION SUBCUTANEOUS at 08:17

## 2021-05-16 RX ADMIN — ACETAMINOPHEN 650 MG: 325 TABLET ORAL at 05:30

## 2021-05-16 RX ADMIN — AMLODIPINE BESYLATE 5 MG: 5 TABLET ORAL at 08:17

## 2021-05-16 RX ADMIN — ACETAMINOPHEN 650 MG: 325 TABLET ORAL at 20:29

## 2021-05-16 RX ADMIN — Medication 3 MG: at 20:29

## 2021-05-16 RX ADMIN — ASPIRIN 81 MG: 81 TABLET, CHEWABLE ORAL at 08:17

## 2021-05-16 RX ADMIN — Medication 10 ML: at 08:17

## 2021-05-16 RX ADMIN — CALCIUM 500 MG: 500 TABLET ORAL at 08:17

## 2021-05-16 RX ADMIN — DONEPEZIL HYDROCHLORIDE 10 MG: 5 TABLET, FILM COATED ORAL at 20:29

## 2021-05-16 RX ADMIN — Medication 10 ML: at 20:29

## 2021-05-16 RX ADMIN — Medication 3 MG: at 00:51

## 2021-05-16 ASSESSMENT — PAIN SCALES - GENERAL: PAINLEVEL_OUTOF10: 6

## 2021-05-16 NOTE — FLOWSHEET NOTE
05/16/21 1458   Encounter Summary   Services provided to: Patient  (Phone visit)   Referral/Consult From: Patient   Support System Orthodoxy/shimon community   [facility]   ([address])   Contact Orthodoxy Completed   Continue Visiting   (5-16, wants communion, declined spiritual communion.)   Complexity of Encounter Low   Length of Encounter 15 minutes   Sacraments   Communion Patient/Family wants communion   Patient upset about missing Mass on Sunday. She would like communion. I offered her a spiritual communion, which she declined. I explained the availability of a [address] tomorrow morning. She felt strongly that she needed communion today. Call placed to Father Alphonso Smith, at her [facility]. I also gave the patient options to view Mass on TV.

## 2021-05-16 NOTE — PROGRESS NOTES
Department of Internal Medicine  Nephrology Progress Note        SUBJECTIVE:    We are following this patient for Hyponatremia. The patient was seen and examined; she feels well today with no CP, SOB, nausea or vomiting. ROS: No fever or chills. Social: Family at bedside. Physical Exam:    VITALS:  /70   Pulse 64   Temp 97.5 °F (36.4 °C) (Oral)   Resp 14   Ht 4' 6\" (1.372 m)   Wt 115 lb 12.8 oz (52.5 kg)   SpO2 96%   BMI 27.92 kg/m²     General appearance: Seems comfortable, no acute distress. Neck: Trachea midline, thyroid normal.   Lungs:  Non labored breathing, CTA to anterior auscultation. Heart:  S1S2 normal, rub or gallop. No peripheral edema. Abdomen: Soft, non-tender, no organomegaly. Skin: No lesions or rashes, warm to touch. DATA:    CBC:   Lab Results   Component Value Date    WBC 4.6 05/16/2021    RBC 4.09 05/16/2021    HGB 12.7 05/16/2021    HCT 36.8 05/16/2021    MCV 90.0 05/16/2021    MCH 31.0 05/16/2021    MCHC 34.4 05/16/2021    RDW 12.5 05/16/2021     05/16/2021    MPV 7.9 05/16/2021     BMP:    Lab Results   Component Value Date     05/16/2021    K 4.3 05/16/2021    CL 98 05/16/2021    CO2 28 05/16/2021    BUN 6 05/16/2021    LABALBU 4.0 05/11/2021    CREATININE <0.5 05/16/2021    CALCIUM 9.6 05/16/2021    GFRAA >60 05/16/2021    LABGLOM >60 05/16/2021    GLUCOSE 94 05/16/2021       IMPRESSION/RECOMMENDATIONS:      Hyponatremia:  Multifactorial and likely secondary to intravascular volume depletion, along with decreased solute intake. Serum sodium trending up nicely s/p e doses of Tolvaptan; we will resume daily free water restriction and monitor.

## 2021-05-16 NOTE — PROGRESS NOTES
Assessment complete and charted. No change in pt status. Pt has not mentioned desire for ARU over the [facility] but was noted to be upset with daughter regarding this per report. VSS. Call light within reach, will continue to monitor.

## 2021-05-16 NOTE — PROGRESS NOTES
Hospitalist Progress Note      PCP: Sincere Garcia MD    Date of Admission: 5/11/2021    Chief Complaint: weakness, falls     Hospital Course: 70 y.o. female PMH falls and HTN. presents for increasing frequent falls. has chronic back pain, chronic ambulatory dysfunction requiring a walker, and dementia.   lives in a single story home with a friend. She typically ambulates using walker. She fell yesterday getting out of a car and again this morning going to the restroom. Today she slid out of her chair. She could not get up on her own and needed assistance getting back to her chair. She already does PT twice a week.   denies any injuries with these falls. denied LOC. No numbness, tingling, or focal weakness.   Patient's daughter, Natasha Drew, is her HCPOA. is currently out of town in [place]. requested that patient be placed in an ECF for rehabilitation   Hyponatremic Na 123, cr <0.5, trop neg. Alb 4.0, CBC nl.   CV19 neg, UA neg. CT head no acute abn, CT Cspine multilevel deg disc dz, CT L spine also shows deg disc dz, age indeterminate T12 left superior endplate wedging and questionable fx. Incidental small splenic artery aneurysm 11mm. Left ankle xray STS may be contusion or sprain. nephrology consulted . Given tolvaptan     Subjective: not on a fluid restrict she is asking for more water in her ice.  Demented confrontational, asked same question repeatedly, wants communion, wanted to know if she was getting another covid test and why arent we doing it all other systems neg       Medications:  Reviewed    Infusion Medications    sodium chloride       Scheduled Medications    enoxaparin  40 mg Subcutaneous Daily    amLODIPine  5 mg Oral Daily    aspirin  81 mg Oral Daily    calcium elemental  500 mg Oral Daily    donepezil  10 mg Oral Nightly     PRN Meds: sodium chloride flush, sodium chloride, promethazine **OR** ondansetron, acetaminophen **OR** acetaminophen, melatonin      Intake/Output Summary (Last 24 hours) at 5/16/2021 0957  Last data filed at 5/16/2021 0728  Gross per 24 hour   Intake 700 ml   Output 300 ml   Net 400 ml       Physical Exam Performed:    /70   Pulse 64   Temp 97.5 °F (36.4 °C) (Oral)   Resp 14   Ht 4' 6\" (1.372 m)   Wt 115 lb 12.8 oz (52.5 kg)   SpO2 96%   BMI 27.92 kg/m²     General appearance: got a little agitated wanting to know answers to questions and wanting water in her ice   HEENT: Pupils equal, round, and reactive to light. .  Neck: Supple, with full range of motion. No jugular venous distention. Trachea midline. Respiratory:  Normal effort. Clear to auscultation,  Cardiovascular: Regular rate and rhythm with normal S1/S2 without murmurs, rubs or gallops. Abdomen: Soft, non-tender, non-distended with normal bowel sounds. Thin build   Musculoskeletal: No clubbing, cyanosis or edema bilaterally. Full range of motion without deformity. Skin: Skin color pink warm dry , texture, turgor normal.  No rashes or lesions. Neurologic:  Neurovascularly intact without any focal sensory/motor deficits. Cranial nerves: II-XII intact, grossly non-focal.  Psychiatric: Alert demented, speaks of juan j and how they go to [facility] on Sunday asked about communion why isnt  here we discussed covid but she didn't like that answer well her [employer] allows it  Peripheral Pulses: +2 palpable, equal bilaterally       Labs:   Recent Labs     05/16/21  0710   WBC 4.6   HGB 12.7   HCT 36.8        Recent Labs     05/15/21  1355 05/15/21  2154 05/16/21  0710   * 129* 132*   K 4.2 5.0 4.3   CL 95* 95* 98*   CO2 23 28 28   BUN 8 7 6*   CREATININE <0.5* 0.6 <0.5*   CALCIUM 9.8 9.4 9.6     No results for input(s): AST, ALT, BILIDIR, BILITOT, ALKPHOS in the last 72 hours. No results for input(s): INR in the last 72 hours. No results for input(s): Jesusita Chicash in the last 72 hours.     Urinalysis:      Lab Results   Component Value Date    NITRU Negative 05/11/2021 WBCUA None seen 10/29/2019    RBCUA None seen 10/29/2019    BLOODU Negative 05/11/2021    SPECGRAV 1.015 05/11/2021    GLUCOSEU Negative 05/11/2021       Radiology:  XR ANKLE LEFT (MIN 3 VIEWS)   Final Result   No acute osseous abnormality evident. Soft tissue edema, presumably reactive edema, contusion and/or sprain. Follow up imaging is recommended if pain persists or worsens. XR CHEST PORTABLE   Final Result   No acute cardiopulmonary process         CT HEAD WO CONTRAST   Final Result   No acute traumatic intracranial abnormality      Atrophy and small-vessel ischemic change      Chronic appearing left sphenoid sinus disease         CT CERVICAL SPINE WO CONTRAST   Final Result   No acute osseous abnormality      Multilevel degenerative disc disease and facet arthropathy throughout. CT LUMBAR SPINE WO CONTRAST   Final Result   Age indeterminate wedging superior endplate E21 left. Correlate with point   tenderness. Scattered levels of degenerative disc disease. 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 Assessment/Plan:    Active Hospital Problems    Diagnosis     Weakness [R53.1]     Generalized weakness [R53.1]     Dementia (HCC) [F03.90]     Frequent falls [R29.6]     DDD (degenerative disc disease), lumbar [M51.36]     DDD (degenerative disc disease), cervical [M50.30]     Hyponatremia [E87.1]      Generalized weakness  Ground level falls  T12 compression fracture -age indeterminate, asymptomatic   --continue PT OT    Hyponatremia, due to intravascular volume depletion and decreased solute intake. SIADH labs not sent   -Na 123  --fluid restricted to 1L/day  Given dose tolvapatan 5/15  as Na ramu to 131 then fell again to 125      Vascular Dementia  --continue donepezil    Incidental small splenic artery aneurysm    See RN note re Joaquín Church with whom pt lives he works all day, was angry she was being sent to SNF, claimed he was POA   There was question of pt being able to make decisions, psych consulted to assess capacity  . Has a caregiver breezy 2x/week   Was determined to lack capacity , not capable of handling her own meds. TADEOOA jenniferwork establishing daughter is on file. SNF to be updated on transfer not to let Joaquín Courts make decisions for her or remove her without daughter's permission   Perhaps they need to consider different living situation long term going forward since pt alone all day     Na 132.  Dc to SNF if ok with renal     DVT Prophylaxis: lovenox  Diet: DIET GENERAL;  Code Status: Full Code    PT/OT Eval Status: PT OT following scored 16/24 with PT     Dispo - to SNF     Sheryle Husk, MD

## 2021-05-17 VITALS
TEMPERATURE: 97.7 F | RESPIRATION RATE: 20 BRPM | HEART RATE: 70 BPM | OXYGEN SATURATION: 97 % | SYSTOLIC BLOOD PRESSURE: 109 MMHG | HEIGHT: 55 IN | DIASTOLIC BLOOD PRESSURE: 66 MMHG | WEIGHT: 115.8 LBS | BODY MASS INDEX: 26.8 KG/M2

## 2021-05-17 LAB
ANION GAP SERPL CALCULATED.3IONS-SCNC: 7 MMOL/L (ref 3–16)
BUN BLDV-MCNC: 7 MG/DL (ref 7–20)
CALCIUM SERPL-MCNC: 9.6 MG/DL (ref 8.3–10.6)
CHLORIDE BLD-SCNC: 99 MMOL/L (ref 99–110)
CO2: 28 MMOL/L (ref 21–32)
CREAT SERPL-MCNC: <0.5 MG/DL (ref 0.6–1.2)
GFR AFRICAN AMERICAN: >60
GFR NON-AFRICAN AMERICAN: >60
GLUCOSE BLD-MCNC: 90 MG/DL (ref 70–99)
MAGNESIUM: 2 MG/DL (ref 1.8–2.4)
POTASSIUM SERPL-SCNC: 4.4 MMOL/L (ref 3.5–5.1)
SARS-COV-2, NAAT: NOT DETECTED
SODIUM BLD-SCNC: 134 MMOL/L (ref 136–145)

## 2021-05-17 PROCEDURE — 80048 BASIC METABOLIC PNL TOTAL CA: CPT

## 2021-05-17 PROCEDURE — 97530 THERAPEUTIC ACTIVITIES: CPT

## 2021-05-17 PROCEDURE — 97535 SELF CARE MNGMENT TRAINING: CPT

## 2021-05-17 PROCEDURE — 6370000000 HC RX 637 (ALT 250 FOR IP): Performed by: INTERNAL MEDICINE

## 2021-05-17 PROCEDURE — 36415 COLL VENOUS BLD VENIPUNCTURE: CPT

## 2021-05-17 PROCEDURE — 87635 SARS-COV-2 COVID-19 AMP PRB: CPT

## 2021-05-17 PROCEDURE — 6360000002 HC RX W HCPCS: Performed by: INTERNAL MEDICINE

## 2021-05-17 PROCEDURE — 83735 ASSAY OF MAGNESIUM: CPT

## 2021-05-17 RX ORDER — AMLODIPINE BESYLATE 5 MG/1
5 TABLET ORAL DAILY
Qty: 30 TABLET | Refills: 3
Start: 2021-05-17

## 2021-05-17 RX ADMIN — CALCIUM 500 MG: 500 TABLET ORAL at 09:54

## 2021-05-17 RX ADMIN — ACETAMINOPHEN 650 MG: 325 TABLET ORAL at 09:53

## 2021-05-17 RX ADMIN — ENOXAPARIN SODIUM 40 MG: 40 INJECTION SUBCUTANEOUS at 09:53

## 2021-05-17 RX ADMIN — AMLODIPINE BESYLATE 5 MG: 5 TABLET ORAL at 09:54

## 2021-05-17 RX ADMIN — ASPIRIN 81 MG: 81 TABLET, CHEWABLE ORAL at 09:53

## 2021-05-17 RX ADMIN — ACETAMINOPHEN 650 MG: 325 TABLET ORAL at 03:24

## 2021-05-17 ASSESSMENT — PAIN DESCRIPTION - PAIN TYPE
TYPE: CHRONIC PAIN
TYPE: CHRONIC PAIN

## 2021-05-17 ASSESSMENT — PAIN SCALES - GENERAL
PAINLEVEL_OUTOF10: 6
PAINLEVEL_OUTOF10: 6

## 2021-05-17 ASSESSMENT — PAIN DESCRIPTION - LOCATION
LOCATION: BACK
LOCATION: BACK

## 2021-05-17 ASSESSMENT — PAIN DESCRIPTION - ORIENTATION
ORIENTATION: LOWER
ORIENTATION: LOWER

## 2021-05-17 NOTE — DISCHARGE SUMMARY
Hospital Medicine Discharge Summary    Patient ID: Jorge Cain      Patient's PCP: Raul Kilgore MD    Admit Date: 5/11/2021     Discharge Date:   5/17/21    Admitting Physician: Franchesca Chnio MD     Discharge Physician: Anastasiya Barone MD     Discharge Diagnoses: Active Hospital Problems    Diagnosis     Weakness [R53.1]     Generalized weakness [R53.1]     Dementia (HCC) [F03.90]     Frequent falls [R29.6]     DDD (degenerative disc disease), lumbar [M51.36]     DDD (degenerative disc disease), cervical [M50.30]     Hyponatremia [E87.1]    incidental splenic artery aneurysm  Indeterminate age T12 wedge compression fracture   HTN  Hyperkalemia    The patient was seen and examined on day of discharge and this discharge summary is in conjunction with any daily progress note from day of discharge. Hospital Course:   70 y. o. female PMH falls and HTN. presents for increasing frequent falls.  has chronic back pain, chronic ambulatory dysfunction requiring a walker, and dementia.   lives in a single story home with a friend. She typically ambulates using walker.  She fell yesterday getting out of a car and again this morning going to the restroom. Christopher Rivera slid out of her chair.  She could not get up on her own and needed assistance getting back to her chair.  She already does PT twice a week. denied any injuries with these falls. denied LOC. No numbness, tingling, or focal weakness.   Patient's daughter, Treva, is her HCPOA. is currently out of town in [place] that patient be placed in an ECF for rehabilitation   Hyponatremic Na 123, cr <0.5, trop neg. Alb 4.0, CBC nl.   CV19 neg, UA neg. CT head no acute abn, CT Cspine multilevel deg disc dz, CT L spine also shows deg disc dz, age indeterminate T12 left superior endplate wedging and questionable fx. Incidental small splenic artery aneurysm 11mm. Left ankle xray STS may be contusion or sprain. nephrology consulted . Treated with IV NS but Na then trended down again so Given tolvaptan. Restart fluid restriction 1-1.2L/day Na 129->132->134 by dc. hyponatremia felt to be due to  Intravascular volume depletion with decreased solute intake.      Socially she lives with Juanpablo Griffith he Kenneth Core' thappy about her going to SNF and claimed POA but hospital confirmed daughter is legal HCPOA    Pt had a mild hyperkalemia K 5 and BP was controlled we dc'd or held her lisinopril continued her low dose amlodipine for BP control      Physical Exam Performed:     /66   Pulse 70   Temp 97.7 °F (36.5 °C) (Oral)   Resp 20   Ht 4' 6\" (1.372 m)   Wt 115 lb 12.8 oz (52.5 kg)   SpO2 97%   BMI 27.92 kg/m²       General appearance:  Pleasantly confused up in chair   HEENT:  Pupils equal, round, and reactive to light. Extra ocular muscles intact. Mucosa moist   Neck: Supple,full range of motion. No JVD. Respiratory:  Normal effort. Clear to auscultation  Cardiovascular:  Regular rate and rhythm with normal S1/S2 without murmurs, rubs or gallops. Abdomen: Soft, non-tender, non-distended with normal bowel sounds. Musculoskeletal: No c/c/e no deformity   Skin: Skin color, texture, turgor normal.  No rashes or lesions. Neurologic:  Neurovascularly intact without any focal sensory/motor deficits. Cranial nerves: II-XII intact, grossly non-focal.  Psychiatric:  Alert and oriented,poor insight poor memory cooperative   Peripheral Pulses: +2 palpable, equal bilaterally       Labs:  For convenience and continuity at follow-up the following most recent labs are provided:      CBC:    Lab Results   Component Value Date    WBC 4.6 05/16/2021    HGB 12.7 05/16/2021    HCT 36.8 05/16/2021     05/16/2021       Renal:    Lab Results   Component Value Date     05/17/2021    K 4.4 05/17/2021    CL 99 05/17/2021    CO2 28 05/17/2021    BUN 7 05/17/2021    CREATININE <0.5 05/17/2021    CALCIUM 9.6 05/17/2021    PHOS 3.6 10/29/2019         Significant Diagnostic Studies    Radiology:   XR ANKLE LEFT (MIN 3 VIEWS)   Final Result   No acute osseous abnormality evident. Soft tissue edema, presumably reactive edema, contusion and/or sprain. Follow up imaging is recommended if pain persists or worsens. XR CHEST PORTABLE   Final Result   No acute cardiopulmonary process         CT HEAD WO CONTRAST   Final Result   No acute traumatic intracranial abnormality      Atrophy and small-vessel ischemic change      Chronic appearing left sphenoid sinus disease         CT CERVICAL SPINE WO CONTRAST   Final Result   No acute osseous abnormality      Multilevel degenerative disc disease and facet arthropathy throughout. CT LUMBAR SPINE WO CONTRAST   Final Result   Age indeterminate wedging superior endplate P32 left. Correlate with point   tenderness. Scattered levels of degenerative disc disease. 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                 Consults:     IP CONSULT TO CASE MANAGEMENT  IP CONSULT TO NEPHROLOGY  IP CONSULT TO SOCIAL WORK  IP CONSULT TO PSYCHIATRY  IP CONSULT TO SPIRITUAL SERVICES    [facility]:  [facility]     Condition at Discharge: Stable    Discharge Instructions/Follow-up:  Primary MD post dc from SNF     Code Status:  Full Code     Activity: activity as tolerated    Diet: regular diet fluid restrict 1-1.2L/day       Discharge Medications:     Current Discharge Medication List           Details   amLODIPine (NORVASC) 5 MG tablet Take 1 tablet by mouth daily  Qty: 30 tablet, Refills: 3              Details   donepezil (ARICEPT) 5 MG tablet Take 10 mg by mouth nightly       calcium carbonate (OSCAL) 500 MG TABS tablet Take 500 mg by mouth daily      Omega-3 1000 MG CAPS Take by mouth      vitamin B-1 (THIAMINE) 100 MG tablet Take 100 mg by mouth daily      Coenzyme Q10 (CO Q 10) 10 MG CAPS Take by mouth             Time Spent on discharge is more than 1 hour in

## 2021-05-17 NOTE — PROGRESS NOTES
Assessment complete and charted. Pt does report increased back pain despite Q2H turns. Heat packs applied and tylenol given x2. Pt able to sleep through most of the night but does c/o pain with movement. Call light within reach, will continue to monitor.

## 2021-05-17 NOTE — CARE COORDINATION
CASE MANAGEMENT DISCHARGE SUMMARY      Discharge to: [facility]    Precertification completed: [facility] Exemption Notification (HENS) completed: yes    New Durable Medical Equipment ordered/agency: none    Transportation:    Family/car:   Medical Transport explained to [employer]. Pt/family voice no agency preference. Agency used:[facility]   time:130pm    Ambulance form completed: Yes    Confirmed discharge plan with:     Patient: yes     Family, name and contact number: Treva [phone]     Facility/Agency, name:  [facility] faxed when available to [phone]   Phone number for report to facility: [phone]     RN, name: Natalie    Note: Discharging nurse to complete [facility], reconcile [facility]S, and place final copy with patient's discharge packet. RN to ensure that written prescriptions for  Level II medications are sent with patient to the facility as per protocol.     Korin Key RN

## 2021-05-17 NOTE — PROGRESS NOTES
Pt resting in chair and chair alarm is on. Call light within reach. Pt complains of having lower back pain. Akin Menon RN

## 2021-05-17 NOTE — DISCHARGE INSTR - COC
(1.372 m)   Wt 115 lb 12.8 oz (52.5 kg)   SpO2 97%   BMI 27.92 kg/m²     Last documented pain score (0-10 scale): Pain Level: 6  Last Weight:   Wt Readings from Last 1 Encounters:   05/15/21 115 lb 12.8 oz (52.5 kg)     Mental Status:  oriented    IV Access:  - None    Nursing Mobility/ADLs:  Walking   Assisted  Transfer  Assisted  Bathing  Assisted  Dressing  Assisted  Toileting  Assisted  Feeding  Assisted  Med Admin  Assisted  Med Delivery   whole    Wound Care Documentation and Therapy:        Elimination:  Continence:   · Bowel: Yes, can be incontinent  · Bladder: Yes, Can be incontinent  Urinary Catheter: None   Colostomy/Ileostomy/Ileal Conduit: No       Date of Last BM: 2021    Intake/Output Summary (Last 24 hours) at 2021 1001  Last data filed at 2021 0956  Gross per 24 hour   Intake 460 ml   Output 950 ml   Net -490 ml     I/O last 3 completed shifts: In: 340 [P.O.:340]  Out: 950 [Urine:950]    Safety Concerns:      At Risk for Falls    Impairments/Disabilities:      [address] Impairments/Disabilities:166332609}    Nutrition Therapy:  Current Nutrition Therapy:   [address] [employer] Diet List:146561071}    Routes of Feeding: {P DME Other Feedings:802701852}  Liquids: {Slp liquid thickness:93912}  Daily Fluid Restriction: {CHP DME Yes amt example:471267269}  Last Modified Barium Swallow with Video (Video Swallowing Test): {Done Not Done JCGL:558581284}    Treatments at the Time of Hospital Discharge:   Respiratory Treatments: ***  Oxygen Therapy:  {Therapy; copd oxygen:14539}  Ventilator:    {[facility] Vent ADWH:549688172}    Rehab Therapies: {THERAPEUTIC INTERVENTION:2706435896}  Weight Bearing Status/Restrictions: { CC Weight Bearin}  Other Medical Equipment (for information only, NOT a DME order):  {EQUIPMENT:308520414}  Other Treatments: ***    Patient's personal belongings (please select all that are sent with patient):  {[facility] DME Belongings:282297626}    RN SIGNATURE: {Esignature:223025360}    CASE MANAGEMENT/SOCIAL WORK SECTION    Inpatient Status Date: 5/14/21    Readmission Risk Assessment Score:  Readmission Risk              Risk of Unplanned Readmission:  10           Discharging to Facility/ Agency   · [facility]: [facility]  · Address:  · Phone:[phone]  · UXF:[phone]    Dialysis Facility (if applicable)   · Name:  · Address:  · Dialysis Schedule:  · Phone:  · Fax:    / signature: Electronically signed by Dao Orellana RN on 5/17/21 at 10:02 AM EDT    PHYSICIAN SECTION    Prognosis: Good    Condition at Discharge: Stable    Rehab Potential (if transferring to Rehab): Good    Recommended Labs or Other Treatments After Discharge:   Recommended Follow-up, Labs or Other Treatments After Discharge:      Repeat sodium level at discretion of the facility   Fluid restricted to 1-1.2L /day            Physician Certification: I certify the above information and transfer of Julienne Graham  is necessary for the continuing treatment of the diagnosis listed and that she requires [place] for less 30 days.      Update Admission H&P: No change in H&P    PHYSICIAN SIGNATURE:  Electronically signed by Aashish Neri MD on 5/17/21 at 10:14 AM EDT

## 2021-05-17 NOTE — PROGRESS NOTES
Occupational Therapy  Facility/Department: [facility] C3 TELE/MED SURG/ONC  Daily Treatment Note  NAME: Phi Mcleod  : 1945  MRN: [id number]    Date of Service: 2021    Discharge Recommendations:  Subacute/Skilled Nursing Facility   Assessment   Performance deficits / Impairments: Decreased functional mobility ; Decreased safe awareness;Decreased balance;Decreased ADL status; Decreased strength;Decreased endurance;Decreased cognition  Assessment: Pt is functioning below baseline for ADLs and mobility. Min A needed for bathroom mobility/toilet transfers and Mod A LE ADLs. She demo's decreased cognition which affects her participation in ADLs and overall safety awareness. Cont skilled OT in acute care. Recommend SNF at d/c. Prognosis: Good  OT Education: OT Role;ADL Adaptive Strategies;Transfer Training;Orientation  Disease Specific Education: Pt educated on importance of OOB mobility, prevention of complications of bedrest, and general safety during hospitalization. Pt verbalized understanding  Barriers to Learning: cognition  REQUIRES OT FOLLOW UP: Yes  Activity Tolerance  Activity Tolerance: Patient Tolerated treatment well  Activity Tolerance: /75, HR 70, O2 sats 97%  Safety Devices  Safety Devices in place: Yes  Type of devices: Call light within reach;Nurse notified; Chair alarm in place; Left in chair;Gait belt       Patient Diagnosis(es): The primary encounter diagnosis was Frequent falls. Diagnoses of Compression fracture of T12 vertebra, initial encounter ([facility] [address] and Hyponatremia were also pertinent to this visit. has a past medical history of Falls and Hypertension. has a past surgical history that includes shoulder surgery; knee surgery; and Hysterectomy.     Restrictions  Restrictions/Precautions  Restrictions/Precautions: General Precautions, Fall Risk  Position Activity Restriction  Other position/activity restrictions: up in chair, purewick, peripheral IV  Subjective   General  Chart Reviewed: Yes  Patient assessed for rehabilitation services?: Yes  Family / Caregiver Present: No  Referring Practitioner: Dudley King MD  Diagnosis: weakness, falls  Subjective  Subjective: Pt agreeable to therapy  General Comment  Comments: RN approved therapy  Pain Assessment  Pain Assessment: 0-10  Pain Level: 6  Pain Type: Chronic pain  Pain Location: Back  Pain Orientation: Lower  Non-Pharmaceutical Pain Intervention(s): Ambulation/Increased Activity;Repositioned; Therapeutic presence  Response to Pain Intervention: Patient Satisfied  Vital Signs  Patient Currently in Pain: Yes   Orientation  Orientation  Overall Orientation Status: Within Functional Limits  Objective    ADL  Feeding: Independent  Grooming: Stand by assistance (seated)  LE Bathing: Moderate assistance;Verbal cueing; Increased time to complete (bath wipes)  LE Dressing: Moderate assistance;Verbal cueing; Increased time to complete (change brief)  Toileting: Moderate assistance; Increased time to complete (assist for brief, pt performed pericare while seated with SBA (vc's to initiate))  Additional Comments: vc's for pt's full participation in ADLs     Balance  Sitting Balance: Supervision  Standing Balance: Contact guard assistance (RW)  Standing Balance  Time: 3 min  Functional Mobility  Functional - Mobility Device: Rolling Walker  Activity: To/from bathroom  Assist Level: Contact guard assistance  Functional Mobility Comments: vc's for safety  Toilet Transfers  Toilet - Technique: Ambulating  Equipment Used: Grab bars  Toilet Transfer: Minimal assistance  Toilet Transfers Comments: vc's for hand placement and safety  Bed mobility  Supine to Sit: Contact guard assistance (HOB elevated, use of bedrail)  Sit to Supine: Unable to assess  Transfers  Stand Pivot Transfers: Contact guard assistance (rw)  Sit to stand: Minimal assistance  Stand to sit: Minimal assistance      Cognition  Overall Cognitive Status: Exceptions  Arousal/Alertness: Appropriate responses to stimuli  Following Commands: Follows one step commands with repetition; Follows one step commands with increased time  Attention Span: Attends with cues to redirect; Difficulty attending to directions  Memory: Decreased recall of recent events;Decreased short term memory  Safety Judgement: Decreased awareness of need for assistance;Decreased awareness of need for safety  Problem Solving: Assistance required to identify errors made;Assistance required to correct errors made;Decreased awareness of errors  Insights: Decreased awareness of deficits  Initiation: Requires cues for some  Sequencing: Requires cues for some  Cognition Comment: Decreased attention and insight. Pt is distracted and needs cues to follow directions. Plan   Plan  Times per week: 3-5x/wk  Current Treatment Recommendations: Balance Training, Functional Mobility Training, Safety Education & Training, Self-Care / ADL, Endurance Training    AM-PAC Score   AM-[facility] Inpatient Daily Activity Raw Score: 16 (05/17/21 1112)  AM-PAC Inpatient ADL T-Scale Score : 35.96 (05/17/21 1112)  ADL Inpatient CMS 0-100% Score: 53.32 (05/17/21 1112)  ADL Inpatient CMS G-Code Modifier : CK (05/17/21 1112)    Goals  Short term goals  Time Frame for Short term goals: 1 week (5/19/21)  Short term goal 1: Pt will complete LB dressing with s. Short term goal 2: Pt will complete 15 reps of BUE exercises for increased endurance and strength. onoging 5/17  Short term goal 3: Pt will complete toileting with SBA.  ongoining required min A.-ongoing, mod A 5/17  Short term goal 4: Pt will complete 5 minutes of dynamic standing for adls with CGA. (1/81/23--NOSMOQ to 5/19) ongoing, 3 min 5/17  Patient Goals   Patient goals : \"to get back home\"     Therapy Time   Individual Concurrent Group Co-treatment   Time In 0823         Time Out 0901         Minutes 38         Timed Code Treatment Minutes: 38 Minutes    If pt is discharged prior to next OT session, this note will serve as the discharge summary.   Larry Aguirre OT

## 2021-05-31 ENCOUNTER — APPOINTMENT (OUTPATIENT)
Dept: GENERAL RADIOLOGY | Age: 76
DRG: 884 | End: 2021-05-31
Payer: MEDICARE

## 2021-05-31 ENCOUNTER — APPOINTMENT (OUTPATIENT)
Dept: CT IMAGING | Age: 76
DRG: 884 | End: 2021-05-31
Payer: MEDICARE

## 2021-05-31 ENCOUNTER — HOSPITAL ENCOUNTER (INPATIENT)
Age: 76
LOS: 2 days | Discharge: NURSING, REHAB OR LONG-TERM CARE | DRG: 884 | End: 2021-06-02
Attending: EMERGENCY MEDICINE | Admitting: INTERNAL MEDICINE
Payer: MEDICARE

## 2021-05-31 DIAGNOSIS — R29.6 FREQUENT FALLS: Primary | ICD-10-CM

## 2021-05-31 DIAGNOSIS — M25.561 ACUTE PAIN OF RIGHT KNEE: ICD-10-CM

## 2021-05-31 DIAGNOSIS — F03.90 DEMENTIA WITHOUT BEHAVIORAL DISTURBANCE, UNSPECIFIED DEMENTIA TYPE: ICD-10-CM

## 2021-05-31 DIAGNOSIS — S70.10XD: ICD-10-CM

## 2021-05-31 DIAGNOSIS — R27.0 ATAXIA: ICD-10-CM

## 2021-05-31 DIAGNOSIS — S70.00XD: ICD-10-CM
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
## 2021-05-31 LAB
A/G RATIO: 1.5 (ref 1.1–2.2)
ALBUMIN SERPL-MCNC: 3.7 G/DL (ref 3.4–5)
ALP BLD-CCNC: 69 U/L (ref 40–129)
ALT SERPL-CCNC: 15 U/L (ref 10–40)
ANION GAP SERPL CALCULATED.3IONS-SCNC: 7 MMOL/L (ref 3–16)
AST SERPL-CCNC: 21 U/L (ref 15–37)
BASOPHILS ABSOLUTE: 0 K/UL (ref 0–0.2)
BASOPHILS RELATIVE PERCENT: 0.9 %
BILIRUB SERPL-MCNC: 0.4 MG/DL (ref 0–1)
BUN BLDV-MCNC: 4 MG/DL (ref 7–20)
CALCIUM SERPL-MCNC: 9.4 MG/DL (ref 8.3–10.6)
CHLORIDE BLD-SCNC: 92 MMOL/L (ref 99–110)
CO2: 27 MMOL/L (ref 21–32)
CREAT SERPL-MCNC: <0.5 MG/DL (ref 0.6–1.2)
EKG ATRIAL RATE: 58 BPM
EKG DIAGNOSIS: NORMAL
EKG P AXIS: 64 DEGREES
EKG P-R INTERVAL: 220 MS
EKG Q-T INTERVAL: 424 MS
EKG QRS DURATION: 72 MS
EKG QTC CALCULATION (BAZETT): 416 MS
EKG R AXIS: 92 DEGREES
EKG T AXIS: 65 DEGREES
EKG VENTRICULAR RATE: 58 BPM
EOSINOPHILS ABSOLUTE: 0.1 K/UL (ref 0–0.6)
EOSINOPHILS RELATIVE PERCENT: 1.2 %
GFR AFRICAN AMERICAN: >60
GFR NON-AFRICAN AMERICAN: >60
GLOBULIN: 2.5 G/DL
GLUCOSE BLD-MCNC: 94 MG/DL (ref 70–99)
HCT VFR BLD CALC: 35.1 % (ref 36–48)
HEMOGLOBIN: 12 G/DL (ref 12–16)
LYMPHOCYTES ABSOLUTE: 1.6 K/UL (ref 1–5.1)
LYMPHOCYTES RELATIVE PERCENT: 29.5 %
MCH RBC QN AUTO: 30.7 PG (ref 26–34)
MCHC RBC AUTO-ENTMCNC: 34.2 G/DL (ref 31–36)
MCV RBC AUTO: 89.7 FL (ref 80–100)
MONOCYTES ABSOLUTE: 0.6 K/UL (ref 0–1.3)
MONOCYTES RELATIVE PERCENT: 10.3 %
NEUTROPHILS ABSOLUTE: 3.2 K/UL (ref 1.7–7.7)
NEUTROPHILS RELATIVE PERCENT: 58.1 %
PDW BLD-RTO: 12.6 % (ref 12.4–15.4)
PLATELET # BLD: 265 K/UL (ref 135–450)
PMV BLD AUTO: 6.9 FL (ref 5–10.5)
POTASSIUM SERPL-SCNC: 4.1 MMOL/L (ref 3.5–5.1)
RBC # BLD: 3.91 M/UL (ref 4–5.2)
SODIUM BLD-SCNC: 126 MMOL/L (ref 136–145)
TOTAL PROTEIN: 6.2 G/DL (ref 6.4–8.2)
WBC # BLD: 5.5 K/UL (ref 4–11)

## 2021-05-31 PROCEDURE — 2580000003 HC RX 258: Performed by: INTERNAL MEDICINE

## 2021-05-31 PROCEDURE — 6370000000 HC RX 637 (ALT 250 FOR IP): Performed by: INTERNAL MEDICINE

## 2021-05-31 PROCEDURE — 72100 X-RAY EXAM L-S SPINE 2/3 VWS: CPT

## 2021-05-31 PROCEDURE — 97162 PT EVAL MOD COMPLEX 30 MIN: CPT

## 2021-05-31 PROCEDURE — 6360000002 HC RX W HCPCS: Performed by: EMERGENCY MEDICINE

## 2021-05-31 PROCEDURE — 97166 OT EVAL MOD COMPLEX 45 MIN: CPT

## 2021-05-31 PROCEDURE — 99285 EMERGENCY DEPT VISIT HI MDM: CPT

## 2021-05-31 PROCEDURE — 1200000000 HC SEMI PRIVATE

## 2021-05-31 PROCEDURE — 97530 THERAPEUTIC ACTIVITIES: CPT

## 2021-05-31 PROCEDURE — 80053 COMPREHEN METABOLIC PANEL: CPT

## 2021-05-31 PROCEDURE — 70450 CT HEAD/BRAIN W/O DYE: CPT

## 2021-05-31 PROCEDURE — 73560 X-RAY EXAM OF KNEE 1 OR 2: CPT

## 2021-05-31 PROCEDURE — 93005 ELECTROCARDIOGRAM TRACING: CPT | Performed by: EMERGENCY MEDICINE

## 2021-05-31 PROCEDURE — 93010 ELECTROCARDIOGRAM REPORT: CPT | Performed by: INTERNAL MEDICINE

## 2021-05-31 PROCEDURE — 2580000003 HC RX 258: Performed by: EMERGENCY MEDICINE

## 2021-05-31 PROCEDURE — 85025 COMPLETE CBC W/AUTO DIFF WBC: CPT

## 2021-05-31 PROCEDURE — 6370000000 HC RX 637 (ALT 250 FOR IP): Performed by: EMERGENCY MEDICINE

## 2021-05-31 PROCEDURE — 73502 X-RAY EXAM HIP UNI 2-3 VIEWS: CPT

## 2021-05-31 PROCEDURE — 97116 GAIT TRAINING THERAPY: CPT

## 2021-05-31 PROCEDURE — 96374 THER/PROPH/DIAG INJ IV PUSH: CPT

## 2021-05-31 RX ORDER — LANOLIN ALCOHOL/MO/W.PET/CERES
100 CREAM (GRAM) TOPICAL DAILY
Status: DISCONTINUED | OUTPATIENT
Start: 2021-06-01 | End: 2021-06-02 | Stop reason: HOSPADM

## 2021-05-31 RX ORDER — LANOLIN ALCOHOL/MO/W.PET/CERES
3 CREAM (GRAM) TOPICAL NIGHTLY PRN
Status: DISCONTINUED | OUTPATIENT
Start: 2021-05-31 | End: 2021-06-02 | Stop reason: HOSPADM

## 2021-05-31 RX ORDER — SODIUM CHLORIDE 9 MG/ML
INJECTION, SOLUTION INTRAVENOUS CONTINUOUS
Status: DISCONTINUED | OUTPATIENT
Start: 2021-05-31 | End: 2021-06-02 | Stop reason: HOSPADM

## 2021-05-31 RX ORDER — POLYETHYLENE GLYCOL 3350 17 G/17G
17 POWDER, FOR SOLUTION ORAL DAILY PRN
Status: DISCONTINUED | OUTPATIENT
Start: 2021-05-31 | End: 2021-06-02 | Stop reason: HOSPADM

## 2021-05-31 RX ORDER — ASCORBIC ACID 1000 MG
1 TABLET ORAL DAILY
Status: DISCONTINUED | OUTPATIENT
Start: 2021-06-01 | End: 2021-05-31 | Stop reason: RX

## 2021-05-31 RX ORDER — ONDANSETRON 2 MG/ML
4 INJECTION INTRAMUSCULAR; INTRAVENOUS EVERY 6 HOURS PRN
Status: DISCONTINUED | OUTPATIENT
Start: 2021-05-31 | End: 2021-06-02 | Stop reason: HOSPADM

## 2021-05-31 RX ORDER — ACETAMINOPHEN 325 MG/1
650 TABLET ORAL EVERY 6 HOURS PRN
Status: DISCONTINUED | OUTPATIENT
Start: 2021-05-31 | End: 2021-06-02 | Stop reason: HOSPADM

## 2021-05-31 RX ORDER — KETOROLAC TROMETHAMINE 30 MG/ML
15 INJECTION, SOLUTION INTRAMUSCULAR; INTRAVENOUS ONCE
Status: COMPLETED | OUTPATIENT
Start: 2021-05-31 | End: 2021-05-31

## 2021-05-31 RX ORDER — PROMETHAZINE HYDROCHLORIDE 25 MG/1
12.5 TABLET ORAL EVERY 6 HOURS PRN
Status: DISCONTINUED | OUTPATIENT
Start: 2021-05-31 | End: 2021-06-02 | Stop reason: HOSPADM

## 2021-05-31 RX ORDER — 0.9 % SODIUM CHLORIDE 0.9 %
1000 INTRAVENOUS SOLUTION INTRAVENOUS ONCE
Status: COMPLETED | OUTPATIENT
Start: 2021-05-31 | End: 2021-05-31

## 2021-05-31 RX ORDER — ACETAMINOPHEN 650 MG/1
650 SUPPOSITORY RECTAL EVERY 6 HOURS PRN
Status: DISCONTINUED | OUTPATIENT
Start: 2021-05-31 | End: 2021-06-02 | Stop reason: HOSPADM

## 2021-05-31 RX ORDER — SODIUM CHLORIDE 0.9 % (FLUSH) 0.9 %
5-40 SYRINGE (ML) INJECTION EVERY 12 HOURS SCHEDULED
Status: DISCONTINUED | OUTPATIENT
Start: 2021-05-31 | End: 2021-06-02 | Stop reason: HOSPADM

## 2021-05-31 RX ORDER — SODIUM CHLORIDE 9 MG/ML
25 INJECTION, SOLUTION INTRAVENOUS PRN
Status: DISCONTINUED | OUTPATIENT
Start: 2021-05-31 | End: 2021-06-02 | Stop reason: HOSPADM

## 2021-05-31 RX ORDER — DONEPEZIL HYDROCHLORIDE 5 MG/1
10 TABLET, FILM COATED ORAL NIGHTLY
Status: DISCONTINUED | OUTPATIENT
Start: 2021-05-31 | End: 2021-06-02 | Stop reason: HOSPADM

## 2021-05-31 RX ORDER — CALCIUM CARBONATE 500(1250)
500 TABLET ORAL DAILY
Status: DISCONTINUED | OUTPATIENT
Start: 2021-06-01 | End: 2021-06-02 | Stop reason: HOSPADM

## 2021-05-31 RX ORDER — LANOLIN ALCOHOL/MO/W.PET/CERES
3 CREAM (GRAM) TOPICAL NIGHTLY PRN
Status: DISCONTINUED | OUTPATIENT
Start: 2021-06-01 | End: 2021-05-31

## 2021-05-31 RX ORDER — LIDOCAINE 4 G/G
1 PATCH TOPICAL DAILY
Status: DISCONTINUED | OUTPATIENT
Start: 2021-05-31 | End: 2021-06-02 | Stop reason: HOSPADM

## 2021-05-31 RX ORDER — AMLODIPINE BESYLATE 5 MG/1
5 TABLET ORAL DAILY
Status: DISCONTINUED | OUTPATIENT
Start: 2021-06-01 | End: 2021-06-02 | Stop reason: HOSPADM

## 2021-05-31 RX ORDER — GAUZE BANDAGE 2" X 2"
100 BANDAGE TOPICAL DAILY
Status: DISCONTINUED | OUTPATIENT
Start: 2021-06-01 | End: 2021-05-31

## 2021-05-31 RX ORDER — SODIUM CHLORIDE 0.9 % (FLUSH) 0.9 %
5-40 SYRINGE (ML) INJECTION PRN
Status: DISCONTINUED | OUTPATIENT
Start: 2021-05-31 | End: 2021-06-02 | Stop reason: HOSPADM

## 2021-05-31 RX ADMIN — ACETAMINOPHEN 650 MG: 325 TABLET ORAL at 19:31

## 2021-05-31 RX ADMIN — SODIUM CHLORIDE: 9 INJECTION, SOLUTION INTRAVENOUS at 16:50

## 2021-05-31 RX ADMIN — Medication 3 MG: at 22:09

## 2021-05-31 RX ADMIN — KETOROLAC TROMETHAMINE 15 MG: 30 INJECTION, SOLUTION INTRAMUSCULAR at 07:29

## 2021-05-31 RX ADMIN — SODIUM CHLORIDE 1000 ML: 9 INJECTION, SOLUTION INTRAVENOUS at 11:20

## 2021-05-31 RX ADMIN — DONEPEZIL HYDROCHLORIDE 10 MG: 5 TABLET, FILM COATED ORAL at 19:31

## 2021-05-31 ASSESSMENT — PAIN SCALES - GENERAL
PAINLEVEL_OUTOF10: 8
PAINLEVEL_OUTOF10: 0
PAINLEVEL_OUTOF10: 7
PAINLEVEL_OUTOF10: 4
PAINLEVEL_OUTOF10: 3
PAINLEVEL_OUTOF10: 7

## 2021-05-31 ASSESSMENT — PAIN DESCRIPTION - LOCATION
LOCATION: BACK

## 2021-05-31 ASSESSMENT — PAIN DESCRIPTION - PAIN TYPE
TYPE: ACUTE PAIN;CHRONIC PAIN
TYPE: ACUTE PAIN
TYPE: ACUTE PAIN;CHRONIC PAIN

## 2021-05-31 ASSESSMENT — PAIN - FUNCTIONAL ASSESSMENT: PAIN_FUNCTIONAL_ASSESSMENT: PREVENTS OR INTERFERES WITH MANY ACTIVE NOT PASSIVE ACTIVITIES

## 2021-05-31 ASSESSMENT — PAIN DESCRIPTION - ORIENTATION
ORIENTATION: LOWER
ORIENTATION: LOWER

## 2021-05-31 NOTE — ED NOTES
Patient on the phone with her roommate Sarah Beth Mercado stating \"I don't know how this happened, but here I am now in the Emergency Department and I'm getting admitted. This is so unfortunate\".      Geeta James RN  05/31/21 8659

## 2021-05-31 NOTE — CARE COORDINATION
Consult received: Assistance with Discharge Planning  Writer met with Patient in room. Patient reports living at home with long time friend. Patient reports is alone from 8a-5pm. Patient reports feeling like she needs more assistance within the home. Recently at [facility] for rehab and was dc home with Husam Rico. Patient unable to recall provider but reports has number at home. Patient feels like may benefit from another rehab stay re falling at home. Patient reports uses 4WW at home for ambulation. May be beneficial for Pt/OT eval to get better baseline of level of function. SW will support as needed. [place]

## 2021-05-31 NOTE — ED NOTES
RN updated patients daughter at this time. Daughter states concern for patients sodium level, states last admission \"her sodium level was really low last time so Nephrology was following her. She is not compliant at home for the fluid restrictions, she drinks too much water and doesn't eat enough\". Daughter will like call back with dispo [phone].            Bere Handley RN  05/31/21 6467

## 2021-05-31 NOTE — ED NOTES
Patient called out \"I need help right now\". RN to bedside, patient states \"I need something to eat, I need to be moved to my other room right now. I need you to turn my TV on and get me up in this bed\". RN explained to patient that she does not have inpatient orders for a room assignment yet but RN will notify her when she does. RN went over menu with patient- patient requesting grilled cheese. Meal ordered for patient at this time. Patient repositioned in bed and TV turned on. Call light remains within reach.       Jigar Velez RN  05/31/21 0616

## 2021-05-31 NOTE — PROGRESS NOTES
Occupational Therapy   Occupational Therapy Initial Assessment  Date: 2021   Patient Name: Ramya Hoyos  MRN: [id number]     : 1945    Date of Service: 2021    Discharge Recommendations:  Subacute/Skilled Nursing Facility   Barriers to home discharge:   [x] Steps to access home entry or bed/bath:   [x] Reported available assist at home upon discharge limited   [x] Patient reports expectation of post acute Discharge disposition to other than home    Assessment   Performance deficits / Impairments: Decreased functional mobility ; Decreased safe awareness;Decreased balance;Decreased ADL status; Decreased strength;Decreased endurance;Decreased high-level IADLs  Assessment: pt reports normally independent with BADL's & functional mobility with RW  & light meal preparation; now with frequent falls , with caregive unable to get pt up off floor; pt cooperative & would benefit from skilled OT services  OT Education: OT Role;Plan of Care;Precautions  Patient Education: disease specific: importance of use of RED/nurse call light for A with transfers/ADL needs; pt able to return demonstrate  REQUIRES OT FOLLOW UP: Yes  Activity Tolerance  Activity Tolerance: Patient Tolerated treatment well  Activity Tolerance: vitals stable throughout on RA;  Safety Devices  Safety Devices in place: Yes  Type of devices: Bed alarm in place;Call light within reach; Left in bed;Nurse notified           Patient Diagnosis(es): There were no encounter diagnoses. has a past medical history of Falls and Hypertension. has a past surgical history that includes shoulder surgery; knee surgery; and Hysterectomy.            Restrictions  Restrictions/Precautions  Restrictions/Precautions: General Precautions, Fall Risk  Position Activity Restriction  Other position/activity restrictions: telemetry, purewick    Subjective   General  Chart Reviewed: Yes  Patient assessed for rehabilitation services?: Yes  Family / Caregiver Present: No  Referring Practitioner: Dr. Zev Gao  Diagnosis: fall  General Comment  Comments: RN cleared pt for OT eval; pt awake in bed  Patient Currently in Pain: Yes  Pain Assessment  Pain Assessment: 0-10  Pain Level: 7  Pain Type: Acute pain;Chronic pain  Pain Location: Back  Pain Orientation: Lower  Functional Pain Assessment: Prevents or interferes with many active not passive activities  Non-Pharmaceutical Pain Intervention(s): Ambulation/Increased Activity; Therapeutic presence;Repositioned  Pre Treatment Pain Screening  Intervention List: Patient able to continue with treatment    Social/Functional History  Social/Functional History  Lives With: Friend(s) (\"Alex\" -- works 6 days/wk)  Type of Home: House  Home Layout: One level  Home Access: Stairs to enter without rails  Entrance Stairs - Number of Steps: 3  Bathroom Shower/Tub: Walk-in shower  Bathroom Toilet: Standard  Bathroom Equipment: Grab bars in shower, Built-in shower seat, Grab bars around toilet  Home Equipment: 4 wheeled walker  Receives Help From: Personal care attendant (2 days per week 10-2 pm)  ADL Assistance: Independent  Homemaking Responsibilities: No (friend and aid complete; able to make small snacks)  Ambulation Assistance: Independent (with [id number])  Transfer Assistance: Independent  Additional Comments: reports slid off toilet in middle of night. Friend who she lives with was unable to help her up until this morning when he assisted her to sitting EOB       Objective        Orientation  Overall Orientation Status: Within Functional Limits     Balance  Sitting Balance: Supervision  Standing Balance: Minimal assistance (with RW)  Standing Balance  Time: 1-2 minutes x 2  Activity: bathroom mobility  Functional Mobility  Functional - Mobility Device: Rolling Walker  Activity: To/from bathroom  Assist Level: Minimal assistance  Toilet Transfers  Toilet - Technique: Ambulating (min assist with RW)  Equipment Used: Grab bars  Toilet Transfer:  Moderate assistance  ADL  Grooming: Minimal assistance (standing at sink to wash hands after toileting)  LE Dressing: Moderate assistance (to thread briefs off/on to knees seated;min assist to pull briefs up over hips in standing)  Toileting: Supervision    RUE Tone: Normotonic  LUE Tone: Normotonic        Transfers  Sit to stand: Minimal assistance  Stand to sit: Minimal assistance  Transfer Comments: cues for safety     Vision - Basic Assessment  Prior Vision: Wears glasses only for reading     Cognition  Overall Cognitive Status: Exceptions  Arousal/Alertness: Appropriate responses to stimuli  Following Commands:  Follows one step commands with increased time  Attention Span: Difficulty dividing attention  Memory: Decreased recall of precautions  Safety Judgement: Decreased awareness of need for safety;Decreased awareness of need for assistance  Insights: Decreased awareness of deficits  Initiation: Requires cues for some  Sequencing: Requires cues for some  Perception  Overall Perceptual Status: Impaired  Perseveration: Perseverates during conversation          LUE AROM : WFL  RUE AROM : WFL          Plan   Plan  Times per week: 3-5x/ week  Current Treatment Recommendations: Strengthening, Endurance Training, Balance Training, Functional Mobility Training, Safety Education & Training, Self-Care / ADL             AM-PAC Score        [facility] Inpatient Daily Activity Raw Score: 15 (05/31/21 1412)  AM-PAC Inpatient ADL T-Scale Score : 34.69 (05/31/21 1412)  ADL Inpatient CMS 0-100% Score: 56.46 (05/31/21 1412)  ADL Inpatient CMS G-Code Modifier : CK (05/31/21 1412)    Goals  Short term goals  Time Frame for Short term goals: 1 week(6-07-21)  Short term goal 1: CGA standing ADL's for 5 minutes by 6-07-21  Short term goal 2: min assist with LE dressing with AE PRN by 6-7-21  Short term goal 3: CGA with functional/toilet transfers by 6-05-21  Short term goal 4: tolerate 15 reps BUE light strengthening to A with transfers  Patient Goals   Patient goals : get stronger       Therapy Time   Individual Concurrent Group Co-treatment   Time In [address] [place] [address]         Time Out 1325         Minutes [address], [place]

## 2021-05-31 NOTE — ED NOTES

## 2021-05-31 NOTE — ED NOTES
Pt ambulated 70 feet on RA and with walker. Assisted pt to sitting position, then stood and ambulated under own power. Was able to get in bed by herself. Pt complained of back and leg pain ambulating, and repeatedly asked to be admitted when ambulating  for physical assistance when she is having back pain. Pt maintained steady gait through out.  Dr Oj Medina aware of ambulation trial.     Honorio Osborneft  05/31/21 2370

## 2021-05-31 NOTE — ED NOTES
Patient provided with cell phone, speaking to her daughter at this time.      Fay Batista RN  05/31/21 9704

## 2021-05-31 NOTE — ED NOTES
Patient states to RN \"I really need to be admitted, I can not go home because I wont be able to take care of myself\". RN explained to patient that Dr. Drake Wang is aware of patients concern and will be back to speak to him regarding all results and plan of care\".      Natalia Schirmer, RN  05/31/21 1029

## 2021-05-31 NOTE — PROGRESS NOTES
Patient admitted to room 548 from ER. Patient oriented to room, call light, bed rails, phone, lights and bathroom. Patient instructed about the schedule of the day including: vital sign frequency, lab draws, possible tests, frequency of MD and staff rounds, including RN/MD rounding together at bedside, daily weights, and I &O's. Patient instructed about prescribed diet, how to use 8MENU, and television. Bed alarm in place, patient aware of placement and reason. Bed locked, in lowest position, side rails up 3/4, call light within reach. Will continue to monitor.

## 2021-05-31 NOTE — ED PROVIDER NOTES
CHIEF COMPLAINT  Fall (sat on toilet and slipped off)      HISTORY OF PRESENT ILLNESS  Nancy Reyna is a 70 y.o. female with a history of frequent falls and hyponatremia and chronic back pain who presents to the ED complaining of fall. Patient was reportedly recently seen for similar. .  She was discharged to rehab facility and recently sent home. Patient has a care provider that is present at least part of the time on most of the days of the week. Per family, patient declined long-term care facility placement at that point. Patient states that she was attempting to transition from toilet to walker today when she slipped and fell landing on the floor. Patient reportedly had to crawl to a phone even with caregiver assistance. Patient reports chronic pain without any new neurologic symptoms. She is unsure if she struck her head. No other complaints, modifying factors or associated symptoms. I have reviewed the following from the nursing documentation. Past Medical History:   Diagnosis Date    Falls     Hypertension      Past Surgical History:   Procedure Laterality Date    HYSTERECTOMY      KNEE SURGERY      SHOULDER SURGERY       History reviewed. No pertinent family history. Social History     Socioeconomic History    Marital status: Single     Spouse name: Not on file    Number of children: Not on file    Years of education: Not on file    Highest education level: Not on file   Occupational History    Not on file   Tobacco Use    Smoking status: Former Smoker    Smokeless tobacco: Never Used   Substance and Sexual Activity    Alcohol use:  Yes    Drug use: Not on file    Sexual activity: Not Currently   Other Topics Concern    Not on file   Social History Narrative    Not on file     Social Determinants of Health     Financial Resource Strain:     Difficulty of Paying Living Expenses:    Food Insecurity:     Worried About Running Out of Food in the Last Year:     Jh iqbal Food in the Last Year:    Transportation Needs:     Lack of Transportation (Medical):  Lack of Transportation (Non-Medical):    Physical Activity:     Days of Exercise per Week:     Minutes of Exercise per Session:    Stress:     Feeling of Stress :    Social Connections:     Frequency of Communication with Friends and Family:     Frequency of Social Gatherings with Friends and Family:     Attends Sikhism Services:     Active Member of Clubs or Organizations:     Attends Club or Organization Meetings:     Marital Status:    Intimate Partner Violence:     Fear of Current or Ex-Partner:     Emotionally Abused:     Physically Abused:     Sexually Abused:      Current Facility-Administered Medications   Medication Dose Route Frequency Provider Last Rate Last Admin    0.9 % sodium chloride bolus  1,000 mL Intravenous Once Atlanta Large, DO 1,000 mL/hr at 05/31/21 1120 1,000 mL at 05/31/21 1120    lidocaine 4 % external patch 1 patch  1 patch Transdermal Daily Elton Large, DO   1 patch at 05/31/21 1029     Current Outpatient Medications   Medication Sig Dispense Refill    amLODIPine (NORVASC) 5 MG tablet Take 1 tablet by mouth daily 30 tablet 3    donepezil (ARICEPT) 5 MG tablet Take 10 mg by mouth nightly       calcium carbonate (OSCAL) 500 MG TABS tablet Take 500 mg by mouth daily      Omega-3 1000 MG CAPS Take by mouth      vitamin B-1 (THIAMINE) 100 MG tablet Take 100 mg by mouth daily      Coenzyme Q10 (CO Q 10) 10 MG CAPS Take by mouth       No Known Allergies    REVIEW OF SYSTEMS  10 systems reviewed, pertinent positives per HPI otherwise noted to be negative. PHYSICAL EXAM  /64   Pulse 76   Temp 97.7 °F (36.5 °C) (Oral)   Resp 22   Wt 114 lb (51.7 kg)   SpO2 98%   BMI 27.49 kg/m²   GENERAL APPEARANCE: Awake and alert. Cooperative. No acute distress. HEAD: Normocephalic. Atraumatic. EYES: PERRL. EOM's grossly intact.   ENT: Mucous membranes are moist.   NECK: Supple, trachea midline. HEART: RRR. Normal S1, S2. No murmurs, rubs or gallops. LUNGS: Respirations unlabored. CTAB. Good air exchange. No wheezes, rales, or rhonchi. Speaking comfortably in full sentences. ABDOMEN: Soft. Non-distended. Non-tender. No guarding or rebound. Normal Bowel sounds. EXTREMITIES: No peripheral edema. MAEE. No acute deformities. SKIN: Warm and dry. No acute rashes. NEUROLOGICAL: Alert and oriented X 3. CN II-XII intact. No gross facial drooping. Strength 5/5, sensation intact. No pronator drift. Normal coordination. No truncal instability. Gait normal.   PSYCHIATRIC: Normal mood and affect. LABS  I have reviewed all labs for this visit.    Results for orders placed or performed during the hospital encounter of 05/31/21   CBC Auto Differential   Result Value Ref Range    WBC 5.5 4.0 - 11.0 K/uL    RBC 3.91 (L) 4.00 - 5.20 M/uL    Hemoglobin 12.0 12.0 - 16.0 g/dL    Hematocrit 35.1 (L) 36.0 - 48.0 %    MCV 89.7 80.0 - 100.0 fL    MCH 30.7 26.0 - 34.0 pg    MCHC 34.2 31.0 - 36.0 g/dL    RDW 12.6 12.4 - 15.4 %    Platelets 387 825 - 393 K/uL    MPV 6.9 5.0 - 10.5 fL    Neutrophils % 58.1 %    Lymphocytes % 29.5 %    Monocytes % 10.3 %    Eosinophils % 1.2 %    Basophils % 0.9 %    Neutrophils Absolute 3.2 1.7 - 7.7 K/uL    Lymphocytes Absolute 1.6 1.0 - 5.1 K/uL    Monocytes Absolute 0.6 0.0 - 1.3 K/uL    Eosinophils Absolute 0.1 0.0 - 0.6 K/uL    Basophils Absolute 0.0 0.0 - 0.2 K/uL   Comprehensive metabolic panel   Result Value Ref Range    Sodium 126 (L) 136 - 145 mmol/L    Potassium 4.1 3.5 - 5.1 mmol/L    Chloride 92 (L) 99 - 110 mmol/L    CO2 27 21 - 32 mmol/L    Anion Gap 7 3 - 16    Glucose 94 70 - 99 mg/dL    BUN 4 (L) 7 - 20 mg/dL    CREATININE <0.5 (L) 0.6 - 1.2 mg/dL    GFR Non-African American >60 >60    GFR African American >60 >60    Calcium 9.4 8.3 - 10.6 mg/dL    Total Protein 6.2 (L) 6.4 - 8.2 g/dL    Albumin 3.7 3.4 - 5.0 g/dL    Albumin/Globulin Ratio 1.5 1.1 - 2.2 Total Bilirubin 0.4 0.0 - 1.0 mg/dL    Alkaline Phosphatase 69 40 - 129 U/L    ALT 15 10 - 40 U/L    AST 21 15 - 37 U/L    Globulin 2.5 g/dL   EKG 12 Lead   Result Value Ref Range    Ventricular Rate 58 BPM    Atrial Rate 58 BPM    P-R Interval 220 ms    QRS Duration 72 ms    Q-T Interval 424 ms    QTc Calculation (Bazett) 416 ms    P Axis 64 degrees    R Axis 92 degrees    T Axis 65 degrees    Diagnosis       Sinus bradycardia with 1st degree A-V block with Premature atrial complexesRightward axisSeptal infarct (cited on or before 31-MAY-2021)Abnormal ECGWhen compared with ECG of 11-MAY-2021 15:01,Premature atrial complexes are now PresentConfirmed by Bekah Christensen MD, [address] ([id number]) on 5/31/2021 8:08:31 AM       EKG  The Ekg interpreted by myself  normal sinus rhythm with a rate of 58  Axis is   rightward  QTc is  normal  Intervals and Durations are unremarkable. No specific ST-T wave changes appreciated. No evidence of acute ischemia. No significant change from prior EKG dated 5/11/2021. Cardiac Monitoring: No ectopy. RADIOLOGY  X-RAYS:  I have reviewed radiologic plain film image(s). ALL OTHER NON-PLAIN FILM IMAGES SUCH AS CT, ULTRASOUND AND MRI HAVE BEEN READ BY THE RADIOLOGIST. CT HEAD WO CONTRAST   Final Result   Stable CT brain with no acute intracranial abnormality and chronic findings   as detailed above. XR LUMBAR SPINE (2-3 VIEWS)   Final Result   No acute osseous injury of the left hip, left knee or lumbar spine. 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 Stable deformities superior endplate of D78. XR HIP LEFT (2-3 VIEWS)   Final Result   No acute osseous injury of the left hip, left knee or lumbar spine. 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 Stable deformities superior endplate of S08. XR KNEE LEFT (1-2 VIEWS)   Final Result   No acute osseous injury of the left hip, left knee or lumbar spine.       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 Stable deformities superior endplate of V83. Rechecks: Physical assessment performed. Patient was able to ambulate via walker without assistance. Stable gait. Patient's emergency contact/power of  was contacted and case was discussed. ED COURSE/MDM  Patient seen and evaluated. Old records reviewed. Labs and imaging reviewed and results discussed with patient. Patient was given pain medication. Patient's imaging appears stable. Labs reveal chronic hyponatremia. Patient was seen and evaluated by case management as well as PT/OT. It was felt that patient was not safe for ambulation without 24-hour care. . Patient was reassessed as noted above . Plan of care discussed with patient and family. Patient and family in agreement with plan. Patient was given scripts for the following medications. I counseled patient how to take these medications. New Prescriptions    No medications on file       CLINICAL IMPRESSION  1. Frequent falls    2. Dementia without behavioral disturbance, unspecified dementia type (HCC)    3. Ataxia        Blood pressure 128/64, pulse 76, temperature 97.7 °F (36.5 °C), temperature source Oral, resp. rate 22, weight 114 lb (51.7 kg), SpO2 98 %. DISPOSITION  Gricelda Causey was admitted in stable condition.         Fouzia Alfaro,   05/31/21 [id number]

## 2021-05-31 NOTE — CARE COORDINATION
Writer spoke with Sarah Berry in OT reports rec SNF placement. Writer placed call to Patient dtr Jared Jhonatangrzegorz updated on Recs and Patient willingness to go back to SNF, but would like not to go back to [place] re location. Dtr okay referral be sent to . [address]. Writer sent referrals. Attempted to call ED to update no response. [place]

## 2021-05-31 NOTE — PROGRESS NOTES
Physical Therapy    Facility/Department: [address] [place] [address]  ED  Initial Assessment    NAME: Terri London  : 1945  MRN: [id number]    Date of Service: 2021    Discharge Recommendations:  Subacute/Skilled Nursing Facility   PT Equipment Recommendations  Equipment Needed: No    Assessment   Body structures, Functions, Activity limitations: Decreased functional mobility ; Increased pain;Decreased balance;Decreased posture;Decreased strength;Decreased safe awareness;Decreased cognition;Decreased endurance;Decreased coordination  Assessment: Pt is 71 yo female who presents to ED after fall at home. States she slid off toilet and friend \"Alex\" unable to assist her from floor until the morning. Pt states she is unable to care for herself at home and perseverating on being admitted to hospital. Grossly min A for mobility with RW d/t poor safety and control. Pt would benefit from continued skilled therapy to address deficits. Recommend SNF at d/c due to impaired cognition, safety, falls, and does not have 24-hr supervision at home. Treatment Diagnosis: impaired functional mobility  Specific instructions for Next Treatment: progress mobility as tolerated  Prognosis: Good  Decision Making: Medium Complexity  PT Education: Goals; General Safety;Gait Training;PT Role;Disease Specific Education;Plan of Care; Functional Mobility Training;Transfer Training  Disease Specific Education: Pt educated on importance of OOB mobility, prevention of complications of bedrest, and general safety during hospitalization. Pt verbalized understanding, will require reinforcement  Barriers to Learning: cognition  REQUIRES PT FOLLOW UP: Yes  Activity Tolerance  Activity Tolerance: Patient Tolerated treatment well;Patient limited by fatigue;Patient limited by endurance; Patient limited by cognitive status; Patient limited by pain  Activity Tolerance: /67, HR 68; SpO2 99% on RA       Patient Diagnosis(es): There were no encounter diagnoses. has a past medical history of Falls and Hypertension. has a past surgical history that includes shoulder surgery; knee surgery; and Hysterectomy.     Restrictions  Restrictions/Precautions  Restrictions/Precautions: General Precautions, Fall Risk  Position Activity Restriction  Other position/activity restrictions: telemetry, purewick  Vision/Hearing  Vision: Impaired  Vision Exceptions: Wears glasses for reading  Hearing: Within functional limits     Subjective  General  Chart Reviewed: Yes  Patient assessed for rehabilitation services?: Yes  Additional Pertinent Hx: Dementia  Response To Previous Treatment: Not applicable  Family / Caregiver Present: No  Referring Practitioner: Dr. Chang Bryant DO  Referral Date : 05/31/21  Follows Commands: Within Functional Limits  General Comment  Comments: Pt resting in bed on approach; MD cleared pt for mobility in ED  Subjective  Subjective: Pt agreeable to therapy  Pain Screening  Patient Currently in Pain: Yes  Pain Assessment  Pain Assessment: 0-10  Pain Type: Acute pain;Chronic pain  Pain Location: Back  Pain Orientation: Lower  Non-Pharmaceutical Pain Intervention(s): Ambulation/Increased Activity;Repositioned    Orientation  Orientation  Overall Orientation Status: Within Functional Limits  Social/Functional History  Social/Functional History  Lives With: Friend(s) (\"Alex\" -- works 6 days/wk)  Type of Home: House  Home Layout: One level  Home Access: Stairs to enter without rails  Entrance Stairs - Number of Steps: 3  Bathroom Shower/Tub: Walk-in shower  Bathroom Toilet: Standard  Bathroom Equipment: Grab bars in shower, Built-in shower seat, Grab bars around toilet  Home Equipment: 4 wheeled walker  Receives Help From: Personal care attendant (2 days per week 10-2 pm)  ADL Assistance: Independent  Homemaking Responsibilities: No (friend and aid complete; able to make small snacks)  Ambulation Assistance: Independent (with [id number])  Transfer Assistance: Independent  Additional Comments: reports slid off toilet in middle of night. Friend who she lives with was unable to help her up until this morning when he assisted her to sitting EOB    Objective     AROM RLE (degrees)  RLE AROM: WFL  AROM LLE (degrees)  LLE AROM : WFL  Strength RLE  Comment: Grossly 4-/5 throughout  Strength LLE  Comment: Grossly 4-/5 throughout        Bed mobility  Supine to Sit: Minimal assistance;Contact guard assistance (pt with immediate posterior lean upon sitting EOB. Able to self correct with tactile cues)  Sit to Supine: Moderate assistance;2 Person assistance  Comment: Pt able to return sit to supine with CGA, however requires mod A x 2 to maintain sitting balance d/t posterior lean and sliding forward off of bed. Bilateral knees blocked     Transfers  Sit to Stand: Minimal Assistance  Stand to sit: Minimal Assistance     Ambulation  Ambulation?: Yes  Ambulation 1  Surface: level tile  Device: Rolling Walker  Assistance: Minimal assistance  Quality of Gait: Min A to negotiate RW and intermittent balance assist. Poor safety with forward flexed posture. [place] gait with unequal step length  Gait Deviations: Shuffles; Slow [place];Decreased step length;Decreased step height  Distance: 60 ft     Balance  Sitting - Static: Fair  Sitting - Dynamic: Poor  Standing - Static: Fair  Standing - Dynamic: Fair;-        Plan   Plan  Times per week: 3-5x/wk  Times per day: Daily  Specific instructions for Next Treatment: progress mobility as tolerated  Current Treatment Recommendations: Strengthening, Neuromuscular Re-education, Safety Education & Training, Balance Training, Endurance Training, Functional Mobility Training, Transfer Training, Gait Training, Equipment Evaluation, Education, & procurement, Patient/Caregiver Education & Training  Safety Devices  Type of devices:  All fall risk precautions in place, Bed alarm in place, Call light within reach, Gait belt, Patient at risk for falls, Nurse notified, Left in bed                        AM-PAC Score     AM-PAC Inpatient Mobility without Stair Climbing Raw Score : 14 (05/31/21 1402)  AM-PAC Inpatient without Stair Climbing T-Scale Score : 40.85 (05/31/21 1402)  Mobility Inpatient CMS 0-100% Score: 53.33 (05/31/21 1402)  Mobility Inpatient without Stair CMS G-Code Modifier : CK (05/31/21 1402)       Goals  Short term goals  Time Frame for Short term goals: 1 week (6/07) unless otherwise specified  Short term goal 1: Pt will be SBA for bed mobility. Short term goal 2: Pt will be SBA for transfers with RW. Short term goal 3: Pt will ambulate 100 ft with RW and SBA. Short term goal 4: 6/04: Pt will participate in 12-15 reps of BLE exercises to promote strength and activity tolerance. Patient Goals   Patient goals : \"to get admitted to hospital\"       Therapy Time   Individual Concurrent Group Co-treatment   Time In 1240         Time Out 1300         Minutes 20         Timed Code Treatment Minutes: Roberto PT, DPT  If pt is unable to be seen after this session, please let this note serve as discharge summary. Please see case management note for discharge disposition. Thank you.

## 2021-05-31 NOTE — ED NOTES
RN spoke to patients roommate Trey Koyanagi who states Buell Spatz is extremely confused and she just keeps falling down. She is not making sense at all. Also, she is not supposed to be drinking a lot of water because her sodium was too low but she hasn't been listening so she has been drinking to much water and not eating a lot\".       Rinku Marquez RN  05/31/21 8697

## 2021-05-31 NOTE — ED NOTES
Dr. Drake Wang at bedside to update patient on plan of care.      Natalia Schirmer, RN  05/31/21 5599

## 2021-05-31 NOTE — H&P
calcium carbonate (OSCAL) 500 MG TABS tablet Take 500 mg by mouth daily    Historical Provider, MD   [place]-3 1000 MG CAPS Take by mouth    Historical Provider, MD   vitamin B-1 (THIAMINE) 100 MG tablet Take 100 mg by mouth daily    Historical Provider, MD   Coenzyme Q10 (CO Q 10) 10 MG CAPS Take by mouth    Historical Provider, MD       Allergies:  Patient has no known allergies. Social History:      The patient currently lives at home    TOBACCO:   reports that she has quit smoking. She has never used smokeless tobacco.  ETOH:   reports current alcohol use. E-Cigarettes/Vaping Use     Questions Responses    E-Cigarette/Vaping Use     Start Date     Passive Exposure     Quit Date     Counseling Given     Comments             Family History:   Reviewed in detail and negative for DM, CAD, Cancer, CVA. REVIEW OF SYSTEMS COMPLETED:   Pertinent positives as noted in the HPI. All other systems reviewed and negative. PHYSICAL EXAM PERFORMED:    BP (!) 134/37   Pulse 58   Temp 97.8 °F (36.6 °C) (Oral)   Resp 16   Ht 4' 6\" (1.372 m)   Wt 130 lb 15.3 oz (59.4 kg)   SpO2 98%   BMI 31.57 kg/m²     General appearance:  No apparent distress, appears stated age and cooperative. HEENT:  Normal cephalic, atraumatic without obvious deformity. Pupils equal, round, and reactive to light. Extra ocular muscles intact. Conjunctivae/corneas clear. Neck: Supple, with full range of motion. No jugular venous distention. Trachea midline. Respiratory:  Normal respiratory effort. Clear to auscultation, bilaterally without Rales/Wheezes/Rhonchi. Cardiovascular:  Regular rate and rhythm with normal S1/S2 without murmurs, rubs or gallops. Abdomen: Soft, non-tender, non-distended with normal bowel sounds. Musculoskeletal:  No clubbing, cyanosis or edema bilaterally. Full range of motion without deformity. Skin: Skin color, texture, turgor normal.  No rashes or lesions.   Neurologic:  Neurovascularly intact without any focal sensory/motor deficits. Cranial nerves: II-XII intact, grossly non-focal.  Psychiatric:  Alert and oriented, thought content appropriate, normal insight  Capillary Refill: Brisk,3 seconds, normal  Peripheral Pulses: +2 palpable, equal bilaterally       Labs:     Recent Labs     05/31/21  0727   WBC 5.5   HGB 12.0   HCT 35.1*        Recent Labs     05/31/21  0727   *   K 4.1   CL 92*   CO2 27   BUN 4*   CREATININE <0.5*   CALCIUM 9.4     Recent Labs     05/31/21  0727   AST 21   ALT 15   BILITOT 0.4   ALKPHOS 69     No results for input(s): INR in the last 72 hours. No results for input(s): Rejeana Kemps in the last 72 hours. Urinalysis:      Lab Results   Component Value Date    NITRU Negative 05/11/2021    WBCUA None seen 10/29/2019    RBCUA None seen 10/29/2019    BLOODU Negative 05/11/2021    SPECGRAV 1.015 05/11/2021    GLUCOSEU Negative 05/11/2021       Radiology:     I personally reviewed the CT head, x-ray L-spine, x-ray left hip, x-ray left knee, also reviewed the radiologist interpretation        CT HEAD WO CONTRAST   Final Result   Stable CT brain with no acute intracranial abnormality and chronic findings   as detailed above. XR LUMBAR SPINE (2-3 VIEWS)   Final Result   No acute osseous injury of the left hip, left knee or lumbar spine. 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 Stable deformities superior endplate of M05. XR HIP LEFT (2-3 VIEWS)   Final Result   No acute osseous injury of the left hip, left knee or lumbar spine. 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 Stable deformities superior endplate of A20. XR KNEE LEFT (1-2 VIEWS)   Final Result   No acute osseous injury of the left hip, left knee or lumbar spine. 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       Stable deformities superior endplate of G73.             ASSESSMENT:PLAN:    Generalized weakness with recurrent falls   -PT/OT eval in ED recommended SNF placement   -Case management consulted   -Fall precautions   -Patient open to being placed in long-term NH    Chronic hyponatremia  Likely due to increased free water intake and low solute intake  -Free water restriction  -We will give 1 bags normal saline infusion, and recheck sodium in a.m. Chronic low back pain  Degenerative disc disease, history of T12 compression fracture  Tylenol as needed for pain    Dementia -continue supportive care, continue donepezil    HTN-controlled, resume Norvasc    DVT Prophylaxis: Lovenox  Diet: DIET GENERAL;  Code Status: Full Code    PT/OT Eval Status: [facility]av[facility]ria stay       Lance Ruiz MD    Thank you Lori Bustillo MD for the opportunity to be involved in this patient's care. If you have any questions or concerns please feel free to contact me at [phone].

## 2021-06-01 LAB
ANION GAP SERPL CALCULATED.3IONS-SCNC: 11 MMOL/L (ref 3–16)
BUN BLDV-MCNC: 4 MG/DL (ref 7–20)
CALCIUM SERPL-MCNC: 8.7 MG/DL (ref 8.3–10.6)
CHLORIDE BLD-SCNC: 95 MMOL/L (ref 99–110)
CO2: 21 MMOL/L (ref 21–32)
CREAT SERPL-MCNC: <0.5 MG/DL (ref 0.6–1.2)
GFR AFRICAN AMERICAN: >60
GFR NON-AFRICAN AMERICAN: >60
GLUCOSE BLD-MCNC: 183 MG/DL (ref 70–99)
POTASSIUM REFLEX MAGNESIUM: 3.6 MMOL/L (ref 3.5–5.1)
SODIUM BLD-SCNC: 127 MMOL/L (ref 136–145)

## 2021-06-01 PROCEDURE — 6370000000 HC RX 637 (ALT 250 FOR IP): Performed by: EMERGENCY MEDICINE

## 2021-06-01 PROCEDURE — 80048 BASIC METABOLIC PNL TOTAL CA: CPT

## 2021-06-01 PROCEDURE — 97110 THERAPEUTIC EXERCISES: CPT

## 2021-06-01 PROCEDURE — 6360000002 HC RX W HCPCS: Performed by: INTERNAL MEDICINE

## 2021-06-01 PROCEDURE — 36415 COLL VENOUS BLD VENIPUNCTURE: CPT

## 2021-06-01 PROCEDURE — 1200000000 HC SEMI PRIVATE

## 2021-06-01 PROCEDURE — 97535 SELF CARE MNGMENT TRAINING: CPT

## 2021-06-01 PROCEDURE — 6370000000 HC RX 637 (ALT 250 FOR IP): Performed by: INTERNAL MEDICINE

## 2021-06-01 PROCEDURE — 97530 THERAPEUTIC ACTIVITIES: CPT

## 2021-06-01 PROCEDURE — 6370000000 HC RX 637 (ALT 250 FOR IP)

## 2021-06-01 PROCEDURE — 97116 GAIT TRAINING THERAPY: CPT

## 2021-06-01 PROCEDURE — 2580000003 HC RX 258: Performed by: INTERNAL MEDICINE

## 2021-06-01 RX ADMIN — CALCIUM 500 MG: 500 TABLET ORAL at 08:18

## 2021-06-01 RX ADMIN — Medication: at 04:43

## 2021-06-01 RX ADMIN — DONEPEZIL HYDROCHLORIDE 10 MG: 5 TABLET, FILM COATED ORAL at 21:16

## 2021-06-01 RX ADMIN — ENOXAPARIN SODIUM 40 MG: 40 INJECTION SUBCUTANEOUS at 08:18

## 2021-06-01 RX ADMIN — ACETAMINOPHEN 650 MG: 325 TABLET ORAL at 21:16

## 2021-06-01 RX ADMIN — Medication 100 MG: at 08:18

## 2021-06-01 RX ADMIN — SODIUM CHLORIDE, PRESERVATIVE FREE 10 ML: 5 INJECTION INTRAVENOUS at 21:16

## 2021-06-01 RX ADMIN — SODIUM CHLORIDE: 9 INJECTION, SOLUTION INTRAVENOUS at 11:24

## 2021-06-01 RX ADMIN — Medication 3 MG: at 21:20

## 2021-06-01 RX ADMIN — AMLODIPINE BESYLATE 5 MG: 5 TABLET ORAL at 08:19

## 2021-06-01 RX ADMIN — ACETAMINOPHEN 650 MG: 325 TABLET ORAL at 02:50

## 2021-06-01 RX ADMIN — ACETAMINOPHEN 650 MG: 325 TABLET ORAL at 11:43

## 2021-06-01 ASSESSMENT — PAIN DESCRIPTION - LOCATION
LOCATION: BACK

## 2021-06-01 ASSESSMENT — PAIN SCALES - GENERAL
PAINLEVEL_OUTOF10: 5
PAINLEVEL_OUTOF10: 9
PAINLEVEL_OUTOF10: 4
PAINLEVEL_OUTOF10: 5
PAINLEVEL_OUTOF10: 7

## 2021-06-01 ASSESSMENT — PAIN DESCRIPTION - PAIN TYPE
TYPE: CHRONIC PAIN
TYPE: ACUTE PAIN
TYPE: CHRONIC PAIN

## 2021-06-01 ASSESSMENT — PAIN DESCRIPTION - DESCRIPTORS: DESCRIPTORS: CONSTANT

## 2021-06-01 ASSESSMENT — PAIN DESCRIPTION - ORIENTATION: ORIENTATION: LOWER

## 2021-06-01 NOTE — PROGRESS NOTES
Physical Therapy  Facility/Department: [facility] C5 - MED SURG/ORTHO  Daily Treatment Note  NAME: Julinene Graham  : 1945  MRN: [id number]    Date of Service: 2021    Discharge Recommendations:  Subacute/Skilled Nursing Facility   PT Equipment Recommendations  Equipment Needed: No    Assessment   Body structures, Functions, Activity limitations: Decreased functional mobility ; Increased pain;Decreased balance;Decreased posture;Decreased strength;Decreased safe awareness;Decreased cognition;Decreased endurance;Decreased coordination  Assessment: Pt is 71 yo female who presents to ED after fall at home. Grossly min A for mobility with RW d/t poor safety and control. Pt would benefit from continued skilled therapy to address deficits. Recommend SNF at d/c due to impaired cognition, safety, falls, and does not have 24-hr supervision at home. Treatment Diagnosis: impaired functional mobility  Prognosis: Good  Decision Making: Medium Complexity  Barriers to Learning: cognition  Activity Tolerance  Activity Tolerance: Patient limited by cognitive status; Patient Tolerated treatment well  Activity Tolerance: BP97/62  HR 72  O2 95% RA     Patient Diagnosis(es): The primary encounter diagnosis was Frequent falls. Diagnoses of Dementia without behavioral disturbance, unspecified dementia type ([address] and Ataxia were also pertinent to this visit. has a past medical history of Falls and Hypertension. has a past surgical history that includes shoulder surgery; knee surgery; and Hysterectomy. Restrictions  Restrictions/Precautions  Restrictions/Precautions: General Precautions, Fall Risk  Position Activity Restriction  Other position/activity restrictions: telemetry, purewick  Subjective   General  Chart Reviewed: Yes  Additional Pertinent Hx: Dementia  Response To Previous Treatment: Patient with no complaints from previous session.   Family / Caregiver Present: No  Referring Practitioner: Dr. Alicia Sheffield, DO  Subjective  Subjective: Pt agreeable to therapy  Pain Screening  Patient Currently in Pain: No  Vital Signs  Patient Currently in Pain: No       Orientation  Orientation  Overall Orientation Status: Within Functional Limits  Cognition      Objective   Bed mobility  Supine to Sit: Unable to assess  Transfers  Sit to Stand: Minimal Assistance (safety cues, including hand placement)  Stand to sit: Minimal Assistance  Ambulation  Ambulation?: Yes  Ambulation 1  Surface: level tile  Device: Rolling Walker  Assistance: Minimal assistance  Quality of Gait: Min A to negotiate RW. Poor safety with forward flexed posture. [place] gait with unequal step length  Gait Deviations: Shuffles; Slow [place];Decreased step length;Decreased step height  Distance: 90' x 2     Balance  Sitting - Static: Good  Sitting - Dynamic: Fair;+  Standing - Static: Fair;+  Standing - Dynamic: Fair  Exercises  Hip Flexion: 15 B  Hip Abduction: 15 B  Knee Long Arc Quad: 15 B  Ankle Pumps: 15 B             AM-PAC Score     AM-PAC Inpatient Mobility without Stair Climbing Raw Score : 14 (06/01/21 1549)  AM-PAC Inpatient without Stair Climbing T-Scale Score : 40.85 (06/01/21 1549)  Mobility Inpatient CMS 0-100% Score: 53.33 (06/01/21 1549)  Mobility Inpatient without Stair CMS G-Code Modifier : CK (06/01/21 1549)       Goals  Short term goals  Time Frame for Short term goals: 1 week (6/07) unless otherwise specified  Short term goal 1: Pt will be SBA for bed mobility. -6/01 NT  Short term goal 2: Pt will be SBA for transfers with RW.   -6/01 min A  Short term goal 3: Pt will ambulate 100 ft with RW and SBA.  -6/01 rw min A 90'  Short term goal 4: 6/04:  Pt will participate in 12-15 reps of BLE exercises to promote strength and activity tolerance.   -6/01 initiated  Patient Goals   Patient goals : \"to get admitted to hospital\"    Plan    Plan  Times per week: 3-5x/wk  Times per day: Daily  Specific instructions for Next Treatment: progress mobility as tolerated  Current Treatment Recommendations: Strengthening, Neuromuscular Re-education, Safety Education & Training, Balance Training, Endurance Training, Functional Mobility Training, Transfer Training, Gait Training, Equipment Evaluation, Education, & procurement, Patient/Caregiver Education & Training  Safety Devices  Type of devices:  All fall risk precautions in place, Bed alarm in place, Call light within reach, Gait belt, Patient at risk for falls, Nurse notified, Left in bed     Therapy Time   Individual Concurrent Group Co-treatment   Time In 1505         Time Out 1543         Minutes 38         Timed Code Treatment Minutes: [address] [place]

## 2021-06-01 NOTE — PROGRESS NOTES
Hospitalist Progress Note      PCP: Tanmay Hussein MD    Date of Admission: 5/31/2021    Chief Complaint: fall    Hospital Course: HPI on admission:\"75 y.o. female who presented to [facility] with above complaints  Patient with PMH of dementia, degenerative disc disease, frequent falls, hyponatremia presented to the ED today via EMS for complaints of fall. Patient reports she felt really weak in the legs and had a fall yesterday evening, was on the floor for a few hours until her roommate [Alex] was able to help her up on her feet and onto the bed. Patient reports today while transferring from the toilet to the walker she slipped and fell landing on the floor without any trauma reported. She found it very difficult to get up on her feet and asked her caregiver to call EMS. Patient reports chronic low back pain otherwise denies anything new. Patient was recently admitted to [facility] and discharged to SNF on 5/17. Patient reports she was there for a day, received physical therapy twice a day and was ultimately discharged home, she is currently using a walker with wheels at home but reports she still feels weak. She is currently open to being placed in a nursing home, per her daughter's wishes. She had refused this the last admission. \"       Subjective:     Pt. Reports feeling better this AM. She has no specific complaints. She is sitting up in the chair and eating well.   Medications:  Reviewed    Infusion Medications    sodium chloride      sodium chloride 75 mL/hr at 06/01/21 1124     Scheduled Medications    lidocaine  1 patch Transdermal Daily    amLODIPine  5 mg Oral Daily    calcium elemental  500 mg Oral Daily    donepezil  10 mg Oral Nightly    sodium chloride flush  5-40 mL Intravenous 2 times per day    enoxaparin  40 mg Subcutaneous Daily    thiamine  100 mg Oral Daily     PRN Meds: sodium chloride flush, sodium chloride, promethazine **OR** ondansetron, polyethylene glycol, acetaminophen **OR** acetaminophen, melatonin      Intake/Output Summary (Last 24 hours) at 6/1/2021 1212  Last data filed at 6/1/2021 0434  Gross per 24 hour   Intake 1000 ml   Output 1500 ml   Net -500 ml       Physical Exam Performed:    /69   Pulse 62   Temp 97.4 °F (36.3 °C) (Oral)   Resp 18   Ht 4' 6\" (1.372 m)   Wt 130 lb 15.3 oz (59.4 kg)   SpO2 96%   BMI 31.57 kg/m²     General appearance: No apparent distress, appears stated age and cooperative. HEENT: Pupils equal, round, and reactive to light. Conjunctivae/corneas clear. Neck: Supple, with full range of motion. No jugular venous distention. Trachea midline. Respiratory:  Normal respiratory effort. Clear to auscultation, bilaterally without Rales/Wheezes/Rhonchi. Cardiovascular: Regular rate and rhythm with normal S1/S2 without murmurs, rubs or gallops. Abdomen: Soft, non-tender, non-distended with normal bowel sounds. Musculoskeletal: no edema    Labs:   Recent Labs     05/31/21  0727   WBC 5.5   HGB 12.0   HCT 35.1*        Recent Labs     05/31/21  0727 06/01/21  0949   * 127*   K 4.1 3.6   CL 92* 95*   CO2 27 21   BUN 4* 4*   CREATININE <0.5* <0.5*   CALCIUM 9.4 8.7     Recent Labs     05/31/21  0727   AST 21   ALT 15   BILITOT 0.4   ALKPHOS 69     No results for input(s): INR in the last 72 hours. No results for input(s): Lilian Grumet in the last 72 hours. Urinalysis:      Lab Results   Component Value Date    NITRU Negative 05/11/2021    WBCUA None seen 10/29/2019    RBCUA None seen 10/29/2019    BLOODU Negative 05/11/2021    SPECGRAV 1.015 05/11/2021    GLUCOSEU Negative 05/11/2021       Radiology:  CT HEAD WO CONTRAST   Final Result   Stable CT brain with no acute intracranial abnormality and chronic findings   as detailed above. XR LUMBAR SPINE (2-3 VIEWS)   Final Result   No acute osseous injury of the left hip, left knee or lumbar spine. 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 Stable deformities superior endplate of U66. XR HIP LEFT (2-3 VIEWS)   Final Result   No acute osseous injury of the left hip, left knee or lumbar spine. 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 Stable deformities superior endplate of A36. XR KNEE LEFT (1-2 VIEWS)   Final Result   No acute osseous injury of the left hip, left knee or lumbar spine. 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 Stable deformities superior endplate of H55. Assessment/Plan:    Active Hospital Problems    Diagnosis     Fall from ground level [W18.30XA]     Hyponatremia [E87.1]     Frequent falls [R29.6]     Dementia (HCC) [F03.90]     Generalized weakness [R53.1]      Generalized weakness with recurrent falls   -PT/OT eval in ED recommended SNF placement   -Case management consulted   -Fall precautions   -Patient open to being placed in long-term NH     Chronic hyponatremia  Likely due to increased free water intake and low solute intake  -Free water restriction  -sodium remains low, cont. To monitor.; may need to be f/u as outpatient     Chronic low back pain  Degenerative disc disease, history of T12 compression fracture  Tylenol as needed for pain     Dementia -continue supportive care, continue donepezil     HTN-controlled, resume Norvasc       DVT Prophylaxis: lovenox  Diet: DIET GENERAL;  Code Status: Full Code    PT/OT Eval Status: ordered, SNF recommended  Dispo - awaiting SNF placement, possibly tomorrow?     Rodolfo Beltran MD

## 2021-06-01 NOTE — PROGRESS NOTES
Occupational Therapy  Facility/Department: [facility] C5 - MED SURG/ORTHO  Daily Treatment Note  NAME: Cecille Braga  : 1945  MRN: [id number]    Date of Service: 2021    Discharge Recommendations:  Subacute/Skilled Nursing Facility       Assessment   Performance deficits / Impairments: Decreased functional mobility ; Decreased safe awareness;Decreased balance;Decreased ADL status; Decreased strength;Decreased endurance;Decreased high-level IADLs    Assessment: Pt pleasant and agreeable to therapy. Pt continues to be limited due to cognition and lack of understanding into deficits. Pt is currently at a min A for functional mobility and transfers. Pt required increased assist for LB dressing. Pt requires increased cues for task initiation and attention to task. Pt educated on BUE exercises for increased endurance. Pt continues to need cues for safety awareness and will need increased assist at discharge. Cont with POC. Disease Specific Education: Pt educated on importance of OOB mobility, prevention of complications of bedrest, and general safety during hospitalization. Pt verbalized understanding      OT Education: OT Role;Plan of Care;Precautions  Patient Education: disease specific: importance of use of RED/nurse call light for A with transfers/ADL needs; pt able to return demonstrate  REQUIRES OT FOLLOW UP: Yes  Activity Tolerance  Activity Tolerance: Patient Tolerated treatment well  Safety Devices  Safety Devices in place: Yes  Type of devices: Call light within reach; Chair alarm in place;Nurse notified; Left in chair;Gait belt         Patient Diagnosis(es): The primary encounter diagnosis was Frequent falls. Diagnoses of Dementia without behavioral disturbance, unspecified dementia type ([address] and Ataxia were also pertinent to this visit. has a past medical history of Falls and Hypertension.    has a past surgical history that includes shoulder surgery; knee surgery; and Hysterectomy. Restrictions  Restrictions/Precautions  Restrictions/Precautions: General Precautions, Fall Risk  Position Activity Restriction  Other position/activity restrictions: telemetry, purewick     Subjective   General  Chart Reviewed: Yes  Patient assessed for rehabilitation services?: Yes  Family / Caregiver Present: No  Referring Practitioner: Dr. Frances Jacobo  Diagnosis: fall  Subjective  Subjective: Pt pleasant and agreeable to therapy  General Comment  Comments: RN approved therapy  Pain Assessment  Pain Assessment: 0-10  Pain Level: 5  Pain Type: Chronic pain  Pain Location: Back  Non-Pharmaceutical Pain Intervention(s): Repositioned  Vital Signs  Patient Currently in Pain: Yes     Orientation  Orientation  Overall Orientation Status: Within Functional Limits     Objective    ADL  Feeding: Setup  Grooming: Minimal assistance (in stance at sink)  LE Dressing: Minimal assistance;Verbal cueing; Increased time to complete (seated to change brief)  Toileting: Minimal assistance (assist with clothing management)        Balance  Sitting Balance: Supervision  Standing Balance: Minimal assistance (RW, forward flexion)  Standing Balance  Time: 1-2 minutes x 2, ~3 minutes  Activity: bathroom mobility, standing level adls  Functional Mobility  Functional - Mobility Device: Rolling Walker  Activity: To/from bathroom  Assist Level: Minimal assistance  Toilet Transfers  Toilet - Technique: Ambulating  Equipment Used: Grab bars  Toilet Transfer: Moderate assistance  Bed mobility  Supine to Sit: Stand by assistance (HOB elevated, use of bed rail, increased time)  Sit to Supine: Unable to assess (up in chair at end of session)  Transfers  Sit to stand: Minimal assistance  Stand to sit: Minimal assistance  Transfer Comments: cues for safety                       Cognition  Overall Cognitive Status: Exceptions  Arousal/Alertness: Appropriate responses to stimuli  Following Commands:  Follows one step commands with increased time  Attention Span: Difficulty dividing attention  Memory: Decreased recall of precautions  Safety Judgement: Decreased awareness of need for safety;Decreased awareness of need for assistance  Insights: Decreased awareness of deficits  Initiation: Requires cues for some  Sequencing: Requires cues for some                    Type of ROM/Therapeutic Exercise  Type of ROM/Therapeutic Exercise: AROM  Comment: BUE seated  Exercises  Shoulder Elevation: x15  Shoulder Flexion: x15  Elbow Flexion: x15  Elbow Extension: x15  Supination: x15  Pronation: x15  Wrist Flexion: x15  Wrist Extension: x15  Grasp/Release: x15                    Plan   Plan  Times per week: 3-5x/ week  Current Treatment Recommendations: Strengthening, Endurance Training, Balance Training, Functional Mobility Training, Safety Education & Training, Self-Care / ADL    AM-PAC Score        AM-PAC Inpatient Daily Activity Raw Score: 15 (06/01/21 1034)  AM-PAC Inpatient ADL T-Scale Score : 34.69 (06/01/21 1034)  ADL Inpatient CMS 0-100% Score: 56.46 (06/01/21 1034)  ADL Inpatient CMS G-Code Modifier : CK (06/01/21 1034)    Goals  Short term goals  Time Frame for Short term goals: 1 week(6-07-21)  Short term goal 1: CGA standing ADL's for 5 minutes by 6-07-21- ongoing  Short term goal 2: min assist with LE dressing with AE PRN by 6-7-21- ongoing  Short term goal 3: CGA with functional/toilet transfers by 6-05-21-ongoing  Short term goal 4: tolerate 15 reps BUE light strengthening to A with transfers goal met 6/01 continue for maintance  Patient Goals   Patient goals : get stronger       Therapy Time   Individual Concurrent Group Co-treatment   Time In 0848         Time Out 0929         Minutes 41         Timed Code Treatment Minutes: [address] [place] [address] Claude Oman, OTR/L    If pt is unable to be seen after this session, please let this note serve as discharge summary. Please see case management note for discharge disposition. Thank you.

## 2021-06-01 NOTE — CARE COORDINATION
CASE MANAGEMENT INITIAL ASSESSMENT      Reviewed chart and completed assessment  With: Pt, daughter Natasha Drew on the phone. Explained Case Management role/services. Primary contact information: Celi GARRIDO Decision Maker :   Primary Decision Maker: Olaf Vickers  ([place]) - Child - [phone]          Can this person be reached and be able to respond quickly, such as within a few minutes or hours? Yes    Admit date/status: IP, 5/31/21  Diagnosis: Fall from ground level. Is this a Readmission?:  No      Insurance: Medicare   Precert required for SNF: No       3 night stay required: No    Living arrangements, Adls, care needs, prior to admission: Lives in a one story house 2SE entrance with friend. Independent in ADL's and family drives. Transportation: family    Durable Medical Equipment at home:  WalkerX__Cane__RTS__ BSC__Shower Chair_X_  02__ HHN__ CPAP__  BiPap__  Hospital Bed__ W/C___ Other__________    Services in the home and/or outpatient, prior to admission: Has a private duty care giver for company. PT/OT recs: SNF    Hospital Exemption Notification (HEN): Needed for SNF, not initiated. Barriers to discharge: None    Plan/comments: CM met with pt and caregiver at bedside with daughter Julianne Palacios on the phone to discuss therapy recs for SNF. Daughter would like referrals to [facility] and [facility]. Referrals faxed LVM with Mikael Tuttle at [facility]. LVM with Fidelina Casey [phone] with [facility]. CM following-Aby Almanzar RN       ADDENDUM 1610: spoke to Jessica Castro at [facility] and they do NOT have skilled beds available.  CM following-Aby Almanzar RN        ECOC on chart for MD signature

## 2021-06-01 NOTE — PROGRESS NOTES
Pt is currently oriented x3. Disoriented to place and having intermittent confusion. Pt boyfriend requesting information on pt care. POA notified and states that boyfriend can be made aware of SNF placement, but no further medical information can be given. Pt boyfriend aware of SNF placement. States that he has POA. Writer encouraged boyfriend to bring in a copy of POA. Boyfriend states he understands that writer does not want to give any information and hung up.

## 2021-06-02 VITALS
TEMPERATURE: 97.4 F | RESPIRATION RATE: 16 BRPM | HEART RATE: 68 BPM | DIASTOLIC BLOOD PRESSURE: 77 MMHG | OXYGEN SATURATION: 96 % | HEIGHT: 55 IN | BODY MASS INDEX: 30.31 KG/M2 | SYSTOLIC BLOOD PRESSURE: 113 MMHG | WEIGHT: 130.95 LBS

## 2021-06-02 LAB — SARS-COV-2, NAAT: NOT DETECTED

## 2021-06-02 PROCEDURE — 6360000002 HC RX W HCPCS: Performed by: INTERNAL MEDICINE

## 2021-06-02 PROCEDURE — 6370000000 HC RX 637 (ALT 250 FOR IP): Performed by: EMERGENCY MEDICINE

## 2021-06-02 PROCEDURE — 6370000000 HC RX 637 (ALT 250 FOR IP): Performed by: INTERNAL MEDICINE

## 2021-06-02 PROCEDURE — 97535 SELF CARE MNGMENT TRAINING: CPT

## 2021-06-02 PROCEDURE — 6370000000 HC RX 637 (ALT 250 FOR IP): Performed by: NURSE PRACTITIONER

## 2021-06-02 PROCEDURE — 87635 SARS-COV-2 COVID-19 AMP PRB: CPT

## 2021-06-02 PROCEDURE — 97530 THERAPEUTIC ACTIVITIES: CPT

## 2021-06-02 PROCEDURE — 2580000003 HC RX 258: Performed by: INTERNAL MEDICINE

## 2021-06-02 RX ORDER — TRAMADOL HYDROCHLORIDE 50 MG/1
50 TABLET ORAL EVERY 6 HOURS PRN
Qty: 12 TABLET | Refills: 0 | Status: SHIPPED | OUTPATIENT
Start: 2021-06-02 | End: 2021-06-05

## 2021-06-02 RX ORDER — LIDOCAINE 4 G/G
1 PATCH TOPICAL DAILY
DISCHARGE
Start: 2021-06-03

## 2021-06-02 RX ORDER — TRAMADOL HYDROCHLORIDE 50 MG/1
50 TABLET ORAL EVERY 6 HOURS PRN
Status: DISCONTINUED | OUTPATIENT
Start: 2021-06-02 | End: 2021-06-02 | Stop reason: HOSPADM

## 2021-06-02 RX ADMIN — Medication 100 MG: at 09:17

## 2021-06-02 RX ADMIN — TRAMADOL HYDROCHLORIDE 50 MG: 50 TABLET, FILM COATED ORAL at 02:25

## 2021-06-02 RX ADMIN — TRAMADOL HYDROCHLORIDE 50 MG: 50 TABLET, FILM COATED ORAL at 14:17

## 2021-06-02 RX ADMIN — SODIUM CHLORIDE: 9 INJECTION, SOLUTION INTRAVENOUS at 08:31

## 2021-06-02 RX ADMIN — CALCIUM 500 MG: 500 TABLET ORAL at 09:16

## 2021-06-02 RX ADMIN — SODIUM CHLORIDE, PRESERVATIVE FREE 10 ML: 5 INJECTION INTRAVENOUS at 08:30

## 2021-06-02 RX ADMIN — ENOXAPARIN SODIUM 40 MG: 40 INJECTION SUBCUTANEOUS at 09:16

## 2021-06-02 ASSESSMENT — PAIN SCALES - GENERAL
PAINLEVEL_OUTOF10: 6
PAINLEVEL_OUTOF10: 8
PAINLEVEL_OUTOF10: 0
PAINLEVEL_OUTOF10: 9
PAINLEVEL_OUTOF10: 7

## 2021-06-02 ASSESSMENT — PAIN DESCRIPTION - LOCATION: LOCATION: BACK;LEG

## 2021-06-02 NOTE — PROGRESS NOTES
Physician Progress Note      PATIENT:               Angus Hope  CSN #:                  [id number]  :                       1945  ADMIT DATE:       2021 6:28 AM  DISCH DATE:  RESPONDING  PROVIDER #:        Emy Loredo MD          QUERY TEXT:    Pt admitted with frequent falls and weakness and -PT/OT eval in ED recommended   SNF placement. If possible, please document in the progress notes and   discharge summary if you are evaluating and / or treating any of the   following: The medical record reflects the following:  Risk Factors: Dementia, Chronic back pain, Chronic hypernatremia  Clinical Indicators: Per ED consult note \"It was felt that patient was not   safe for ambulation without 24-hour care\". Per H&P Ann Agarwal is currently using a   walker with wheels at home but reports she still feels weak\"  Treatment: PT & OT, fall precautions, Case management consult, possible ECF   placement, serial labs, supportive care. Thank you  [email or web address]  Options provided:  -- Age Related Physical Debility  -- Frequent falls and weakness due to other, Please document other cause. -- Other - I will add my own diagnosis  -- Disagree - Not applicable / Not valid  -- Disagree - Clinically unable to determine / Unknown  -- Refer to Clinical Documentation Reviewer    PROVIDER RESPONSE TEXT:    This patient has age related physical debility.     Query created by: Eliu Burks on 2021 11:10 AM      Electronically signed by:  Emy Loredo MD 2021 8:47 AM

## 2021-06-02 NOTE — DISCHARGE SUMMARY
Hospital Medicine Discharge Summary    Patient ID: Jacob Fong      Patient's PCP: Tessa Sotelo MD    Admit Date: 5/31/2021     Discharge Date:   06/02/21     Admitting Physician: Christi Vaughn MD     Discharge Physician: Aysha Escobar MD     Discharge Diagnoses: Active Hospital Problems    Diagnosis     Fall from ground level [W18.30XA]     Hyponatremia [E87.1]     Frequent falls [R29.6]     Dementia ([address] [F03.90]     Generalized weakness [R53.1]        The patient was seen and examined on day of discharge and this discharge summary is in conjunction with any daily progress note from day of discharge. Hospital Course:     Patient was readmitted after recent discharge to skilled nursing facility. Developed progressive weakness. Had one episode of fall which did not result in injury or fractures. Recently transferred to skilled nursing facility, from where she went home and now she is back to hospital care. Evaluated by PT OT. Hyponatremia was previously known and currently outside the critical range. 127 is how she was discharged before and is deemed to be a safe level for this patient. Imaging did not show any acute fractures or old ones. Patient will most likely require long-term care skilled nursing facility. Being discharged to skilled for short-term. Probable transition to long-term will be needed. Family members agreeable. Because of dementia, patient is not able to understand. Physical Exam Performed:     /77   Pulse 68   Temp 97.4 °F (36.3 °C) (Oral)   Resp 16   Ht 4' 6\" (1.372 m)   Wt 130 lb 15.3 oz (59.4 kg)   SpO2 96%   BMI 31.57 kg/m²       General appearance:  No apparent distress, appears stated age and cooperative. HEENT:  Normal cephalic, atraumatic without obvious deformity. Pupils equal, round, and reactive to light. Extra ocular muscles intact. Conjunctivae/corneas clear. Neck: Supple, with full range of motion.  No

## 2021-06-02 NOTE — DISCHARGE INSTR - COC
to Facility/ Agency   · Name: [facility]any [facility]  · Address:  · Phone: [phone]  · Fax: [phone]    / signature: Electronically signed by Monica Rodriguez on 6/2/21 at 1:48 PM EDT    PHYSICIAN SECTION    Prognosis: Good    Condition at Discharge: Stable    Rehab Potential (if transferring to Rehab): Good    Recommended Labs or Other Treatments After Discharge: Weekly BMP x 3    Physician Certification: I certify the above information and transfer of Allen Ding  is necessary for the continuing treatment of the diagnosis listed and that she requires [place] for less 30 days.      Update Admission H&P: No change in H&P    PHYSICIAN SIGNATURE:  Electronically signed by Chao Oliver MD on 6/2/21 at 12:16 PM EDT

## 2021-06-02 NOTE — PROGRESS NOTES
Occupational Therapy  Facility/Department: [facility] C5 - MED SURG/ORTHO  Daily Treatment Note  NAME: Vaughn Barksdale  : 1945  MRN: [id number]    Date of Service: 2021    Discharge Recommendations:  Subacute/Skilled Nursing Facility     Assessment   Performance deficits / Impairments: Decreased functional mobility ; Decreased safe awareness;Decreased balance;Decreased ADL status; Decreased strength;Decreased endurance;Decreased high-level IADLs;Decreased cognition  Assessment: Pt very limited by cognitive deficits this date. Pt with poor insight into deficits as well as not receptive to education. Pt perseverating on need for dry toilet paper during bowel hygiene, overall took ~45 mins to complete toileting task. Pt requires cues and redirection throughout session, overall functioning below her baseline and would benefit from continued skilled OT in SNF setting at d/c. Prognosis: Fair  OT Education: OT Role;Plan of Care;ADL Adaptive Strategies;Transfer Training;Orientation  Barriers to Learning: cognition  REQUIRES OT FOLLOW UP: Yes  Activity Tolerance  Activity Tolerance: Patient Tolerated treatment well  Activity Tolerance: Vitals: BP= 113/77, HR= 68, SPO2= 96% RA  Safety Devices  Safety Devices in place: Yes  Type of devices: Left in chair;Chair alarm in place;Call light within reach;Nurse notified;Gait belt         Patient Diagnosis(es): The primary encounter diagnosis was Frequent falls. Diagnoses of Dementia without behavioral disturbance, unspecified dementia type ([address] Ataxia, Contusion of hip and thigh, unspecified laterality, subsequent encounter, and Acute pain of right knee were also pertinent to this visit. has a past medical history of Falls and Hypertension. has a past surgical history that includes shoulder surgery; knee surgery; and Hysterectomy.     Restrictions  Restrictions/Precautions  Restrictions/Precautions: General Precautions, Fall Risk  Position Activity Restriction  Other position/activity restrictions: telemetry, purewick     Subjective   General  Chart Reviewed: Yes  Patient assessed for rehabilitation services?: Yes  Response to previous treatment: Patient with no complaints from previous session  Family / Caregiver Present: No  Referring Practitioner: Dr. Alicia Sheffield  Diagnosis: fall    Subjective  Subjective: Pt resting in bed, agreeable to OT treatment, reports the need to go to the bathroom and \"get cleaned up. \"    Pain Assessment  Non-Pharmaceutical Pain Intervention(s): Repositioned; Ambulation/Increased Activity  Vital Signs  Patient Currently in Pain: Yes (not rated but c/o back pain with mobility)     Orientation  Orientation  Orientation Level: Oriented to person     Objective    ADL  Grooming: Moderate assistance;Verbal cueing; Increased time to complete (for thorough handwashing)  LE Dressing: Maximum assistance;Verbal cueing; Increased time to complete (socks and pull up briefs)  Toileting: Maximum assistance; Increased time to complete (significantly increased time, total toileting time ~45 mins see comment below)  Additional Comments: Pt with purewick at approach but noted to have both stool and urine in briefs, pt requesting to go into bathroom. Pt with additional urine and stool output while on commode, pt insistent on using only toilet paper and no wet wipes for bowel hygiene, pt with decreased accuracy and thoroughness with bowel hygiene requiring max encouragement to allow writer to assist with decreased attention and not receptive to education on different techniques, materials, or positions to trial for hygiene. Pt with stool all over RUE hand requiring moderate A to cleanse thoroughly with a bath wipe after pt reported using soap at sink (although no soap used). Pt with poor insight into deficits and not receptive to education.      Balance  Sitting Balance: Supervision  Standing Balance: Minimal assistance (with RW)  Standing Balance  Activity: functional/bathroom mobility, sink ADLs, toileting    Functional Mobility  Functional - Mobility Device: Rolling Walker  Activity: To/from bathroom  Assist Level: Minimal assistance    Toilet Transfers  Toilet - Technique: Ambulating  Equipment Used: Standard toilet (with danny grab bars)  Toilet Transfer: Moderate assistance    Bed mobility  Supine to Sit: Minimal assistance (to R with HOB elevated)     Transfers  Sit to stand: Moderate assistance  Stand to sit: Minimal assistance  Transfer Comments: cues for safety     Cognition  Overall Cognitive Status: Exceptions  Following Commands: Follows one step commands with repetition; Inconsistently follows commands; Follows one step commands with increased time  Attention Span: Difficulty dividing attention; Difficulty attending to directions  Memory: Decreased recall of recent events;Decreased short term memory  Safety Judgement: Decreased awareness of need for safety;Decreased awareness of need for assistance  Problem Solving: Assistance required to identify errors made;Assistance required to generate solutions;Assistance required to correct errors made;Assistance required to implement solutions;Decreased awareness of errors  Insights: Decreased awareness of deficits  Initiation: Requires cues for some  Sequencing: Requires cues for some     Plan   Plan  Times per week: 3-5x/ week  Current Treatment Recommendations: Strengthening, Endurance Training, Balance Training, Functional Mobility Training, Safety Education & Training, Self-Care / ADL    AM-PAC Score  AM-[facility] Inpatient Daily Activity Raw Score: 13 (06/02/21 1416)  AM-PAC Inpatient ADL T-Scale Score : 32.03 (06/02/21 1416)  ADL Inpatient CMS 0-100% Score: 63.03 (06/02/21 1416)  ADL Inpatient CMS G-Code Modifier : CL (06/02/21 1416)    Goals  Short term goals  Time Frame for Short term goals: 1 week(6-07-21)  Short term goal 1: CGA standing ADL's for 5 minutes by 6-07-21- ongoing  Short term goal 2: min assist with LE dressing with AE PRN by 6-7-21- ongoing  Short term goal 3: CGA with functional/toilet transfers by 6-05-21-ongoing  Short term goal 4: tolerate 15 reps BUE light strengthening to A with transfers goal met 6/01 continue for maintance  Patient Goals   Patient goals : get stronger       Therapy Time   Individual Concurrent Group Co-treatment   Time In 1130         Time Out 1223         Minutes [address], [place]/L

## 2021-06-02 NOTE — CARE COORDINATION
SW placed call to follow up on referral with [facility]. SW left  with admissions to call back. Ronny will follow     Addendum: [facility] received referral and will review pt now.  They accept [place] and would need a rapid COVID

## 2023-09-19 ENCOUNTER — HOSPITAL ENCOUNTER (EMERGENCY)
Age: 78
Discharge: NURSING, REHAB OR LONG-TERM CARE | End: 2023-09-19
Attending: STUDENT IN AN ORGANIZED HEALTH CARE EDUCATION/TRAINING PROGRAM
Payer: MEDICARE

## 2023-09-19 VITALS
DIASTOLIC BLOOD PRESSURE: 65 MMHG | OXYGEN SATURATION: 96 % | SYSTOLIC BLOOD PRESSURE: 145 MMHG | BODY MASS INDEX: 20.49 KG/M2 | WEIGHT: 120 LBS | HEART RATE: 58 BPM | RESPIRATION RATE: 16 BRPM | HEIGHT: 64 IN | TEMPERATURE: 98.1 F

## 2023-09-19 DIAGNOSIS — Z00.8 ENCOUNTER FOR MEDICAL ASSESSMENT: Primary | ICD-10-CM

## 2023-09-19 DIAGNOSIS — G89.29 CHRONIC BILATERAL LOW BACK PAIN WITHOUT SCIATICA: ICD-10-CM

## 2023-09-19 DIAGNOSIS — M54.50 CHRONIC BILATERAL LOW BACK PAIN WITHOUT SCIATICA: ICD-10-CM

## 2023-09-19 PROCEDURE — 6370000000 HC RX 637 (ALT 250 FOR IP): Performed by: STUDENT IN AN ORGANIZED HEALTH CARE EDUCATION/TRAINING PROGRAM

## 2023-09-19 PROCEDURE — 99283 EMERGENCY DEPT VISIT LOW MDM: CPT

## 2023-09-19 RX ORDER — IBUPROFEN 800 MG/1
800 TABLET ORAL ONCE
Status: COMPLETED | OUTPATIENT
Start: 2023-09-19 | End: 2023-09-19

## 2023-09-19 RX ORDER — METHOCARBAMOL 500 MG/1
500 TABLET, FILM COATED ORAL ONCE
Status: COMPLETED | OUTPATIENT
Start: 2023-09-19 | End: 2023-09-19

## 2023-09-19 RX ADMIN — METHOCARBAMOL TABLETS 500 MG: 500 TABLET, COATED ORAL at 01:59

## 2023-09-19 RX ADMIN — IBUPROFEN 800 MG: 800 TABLET, FILM COATED ORAL at 01:59

## 2023-09-19 ASSESSMENT — LIFESTYLE VARIABLES
HOW MANY STANDARD DRINKS CONTAINING ALCOHOL DO YOU HAVE ON A TYPICAL DAY: PATIENT DOES NOT DRINK
HOW OFTEN DO YOU HAVE A DRINK CONTAINING ALCOHOL: NEVER

## 2023-09-19 ASSESSMENT — PAIN - FUNCTIONAL ASSESSMENT: PAIN_FUNCTIONAL_ASSESSMENT: NONE - DENIES PAIN
